# Patient Record
Sex: FEMALE | Race: WHITE | Employment: OTHER | URBAN - METROPOLITAN AREA
[De-identification: names, ages, dates, MRNs, and addresses within clinical notes are randomized per-mention and may not be internally consistent; named-entity substitution may affect disease eponyms.]

---

## 2019-03-06 ENCOUNTER — ANESTHESIA EVENT (OUTPATIENT)
Dept: SURGERY | Age: 79
End: 2019-03-06
Payer: MEDICARE

## 2019-03-06 PROBLEM — H25.12 NUCLEAR SCLEROSIS OF LEFT EYE: Status: ACTIVE | Noted: 2019-03-06

## 2019-03-06 RX ORDER — SODIUM CHLORIDE 0.9 % (FLUSH) 0.9 %
5-40 SYRINGE (ML) INJECTION AS NEEDED
Status: CANCELLED | OUTPATIENT
Start: 2019-03-06

## 2019-03-06 RX ORDER — SODIUM CHLORIDE 0.9 % (FLUSH) 0.9 %
5-40 SYRINGE (ML) INJECTION EVERY 8 HOURS
Status: CANCELLED | OUTPATIENT
Start: 2019-03-06

## 2019-03-06 RX ORDER — ACETAMINOPHEN 325 MG/1
650 TABLET ORAL
Status: CANCELLED | OUTPATIENT
Start: 2019-03-06

## 2019-03-06 RX ORDER — SODIUM CHLORIDE, SODIUM LACTATE, POTASSIUM CHLORIDE, CALCIUM CHLORIDE 600; 310; 30; 20 MG/100ML; MG/100ML; MG/100ML; MG/100ML
125 INJECTION, SOLUTION INTRAVENOUS CONTINUOUS
Status: CANCELLED | OUTPATIENT
Start: 2019-03-06

## 2019-03-07 ENCOUNTER — ANESTHESIA (OUTPATIENT)
Dept: SURGERY | Age: 79
End: 2019-03-07
Payer: MEDICARE

## 2019-03-07 ENCOUNTER — HOSPITAL ENCOUNTER (OUTPATIENT)
Age: 79
Setting detail: OUTPATIENT SURGERY
Discharge: HOME | End: 2019-03-07
Attending: OPHTHALMOLOGY | Admitting: OPHTHALMOLOGY
Payer: MEDICARE

## 2019-03-07 VITALS
DIASTOLIC BLOOD PRESSURE: 50 MMHG | HEART RATE: 56 BPM | OXYGEN SATURATION: 100 % | TEMPERATURE: 97.4 F | SYSTOLIC BLOOD PRESSURE: 133 MMHG | BODY MASS INDEX: 39.24 KG/M2 | HEIGHT: 62 IN | WEIGHT: 213.25 LBS | RESPIRATION RATE: 16 BRPM

## 2019-03-07 PROCEDURE — 76060000032 HC ANESTHESIA 0.5 TO 1 HR: Performed by: OPHTHALMOLOGY

## 2019-03-07 PROCEDURE — 76010000138 HC OR TIME 0.5 TO 1 HR: Performed by: OPHTHALMOLOGY

## 2019-03-07 PROCEDURE — 76210000021 HC REC RM PH II 0.5 TO 1 HR: Performed by: OPHTHALMOLOGY

## 2019-03-07 PROCEDURE — 77030006704 HC BLD OPHTH SLT ALCN -B: Performed by: OPHTHALMOLOGY

## 2019-03-07 PROCEDURE — 74011250636 HC RX REV CODE- 250/636

## 2019-03-07 PROCEDURE — 77030006694 HC BLD OPHTH CRESC ALCN -B: Performed by: OPHTHALMOLOGY

## 2019-03-07 PROCEDURE — 77030032490 HC SLV COMPR SCD KNE COVD -B: Performed by: OPHTHALMOLOGY

## 2019-03-07 PROCEDURE — 77030031761 HC CYSTOTOM OPHTH IRR SYS BVTC -A: Performed by: OPHTHALMOLOGY

## 2019-03-07 PROCEDURE — 77030006692 HC BLD OPHTH BVRGD BD -B: Performed by: OPHTHALMOLOGY

## 2019-03-07 PROCEDURE — 74011000250 HC RX REV CODE- 250: Performed by: OPHTHALMOLOGY

## 2019-03-07 PROCEDURE — 77030011291 HC ERAS HEMSTAT BVTC -A: Performed by: OPHTHALMOLOGY

## 2019-03-07 PROCEDURE — 74011250636 HC RX REV CODE- 250/636: Performed by: OPHTHALMOLOGY

## 2019-03-07 PROCEDURE — V2632 POST CHMBR INTRAOCULAR LENS: HCPCS | Performed by: OPHTHALMOLOGY

## 2019-03-07 PROCEDURE — 77030013389: Performed by: OPHTHALMOLOGY

## 2019-03-07 DEVICE — LENS INTOCU +18.5 DIOPT L13MM DIA6MM A CONSTANT 118.8 10DEG: Type: IMPLANTABLE DEVICE | Site: EYE | Status: FUNCTIONAL

## 2019-03-07 RX ORDER — ONDANSETRON 2 MG/ML
INJECTION INTRAMUSCULAR; INTRAVENOUS AS NEEDED
Status: DISCONTINUED | OUTPATIENT
Start: 2019-03-07 | End: 2019-03-07 | Stop reason: HOSPADM

## 2019-03-07 RX ORDER — CYCLOPENTOLATE HYDROCHLORIDE 10 MG/ML
1 SOLUTION/ DROPS OPHTHALMIC
Status: COMPLETED | OUTPATIENT
Start: 2019-03-07 | End: 2019-03-07

## 2019-03-07 RX ORDER — TROPICAMIDE 10 MG/ML
1 SOLUTION/ DROPS OPHTHALMIC
Status: COMPLETED | OUTPATIENT
Start: 2019-03-07 | End: 2019-03-07

## 2019-03-07 RX ORDER — LIDOCAINE HYDROCHLORIDE 20 MG/ML
INJECTION, SOLUTION INFILTRATION; PERINEURAL AS NEEDED
Status: DISCONTINUED | OUTPATIENT
Start: 2019-03-07 | End: 2019-03-07 | Stop reason: HOSPADM

## 2019-03-07 RX ORDER — MIDAZOLAM HYDROCHLORIDE 1 MG/ML
INJECTION, SOLUTION INTRAMUSCULAR; INTRAVENOUS AS NEEDED
Status: DISCONTINUED | OUTPATIENT
Start: 2019-03-07 | End: 2019-03-07 | Stop reason: HOSPADM

## 2019-03-07 RX ORDER — OFLOXACIN 3 MG/ML
1 SOLUTION/ DROPS OPHTHALMIC
Status: COMPLETED | OUTPATIENT
Start: 2019-03-07 | End: 2019-03-07

## 2019-03-07 RX ORDER — KETOROLAC TROMETHAMINE 5 MG/ML
1 SOLUTION OPHTHALMIC
Status: COMPLETED | OUTPATIENT
Start: 2019-03-07 | End: 2019-03-07

## 2019-03-07 RX ORDER — PROPOFOL 10 MG/ML
INJECTION, EMULSION INTRAVENOUS AS NEEDED
Status: DISCONTINUED | OUTPATIENT
Start: 2019-03-07 | End: 2019-03-07 | Stop reason: HOSPADM

## 2019-03-07 RX ORDER — NEOMYCIN SULFATE, POLYMYXIN B SULFATE, AND DEXAMETHASONE 3.5; 10000; 1 MG/G; [USP'U]/G; MG/G
OINTMENT OPHTHALMIC AS NEEDED
Status: DISCONTINUED | OUTPATIENT
Start: 2019-03-07 | End: 2019-03-07 | Stop reason: HOSPADM

## 2019-03-07 RX ORDER — SODIUM CHLORIDE, SODIUM LACTATE, POTASSIUM CHLORIDE, CALCIUM CHLORIDE 600; 310; 30; 20 MG/100ML; MG/100ML; MG/100ML; MG/100ML
125 INJECTION, SOLUTION INTRAVENOUS CONTINUOUS
Status: DISCONTINUED | OUTPATIENT
Start: 2019-03-07 | End: 2019-03-07 | Stop reason: HOSPADM

## 2019-03-07 RX ORDER — PHENYLEPHRINE HYDROCHLORIDE 100 MG/ML
1 SOLUTION/ DROPS OPHTHALMIC
Status: COMPLETED | OUTPATIENT
Start: 2019-03-07 | End: 2019-03-07

## 2019-03-07 RX ORDER — PROPARACAINE HYDROCHLORIDE 5 MG/ML
1 SOLUTION/ DROPS OPHTHALMIC
Status: COMPLETED | OUTPATIENT
Start: 2019-03-07 | End: 2019-03-07

## 2019-03-07 RX ORDER — BUPIVACAINE HYDROCHLORIDE 7.5 MG/ML
INJECTION, SOLUTION EPIDURAL; RETROBULBAR AS NEEDED
Status: DISCONTINUED | OUTPATIENT
Start: 2019-03-07 | End: 2019-03-07 | Stop reason: HOSPADM

## 2019-03-07 RX ORDER — LIDOCAINE HYDROCHLORIDE 10 MG/ML
INJECTION, SOLUTION EPIDURAL; INFILTRATION; INTRACAUDAL; PERINEURAL AS NEEDED
Status: DISCONTINUED | OUTPATIENT
Start: 2019-03-07 | End: 2019-03-07 | Stop reason: HOSPADM

## 2019-03-07 RX ADMIN — MIDAZOLAM HYDROCHLORIDE 1 MG: 1 INJECTION, SOLUTION INTRAMUSCULAR; INTRAVENOUS at 08:21

## 2019-03-07 RX ADMIN — KETOROLAC TROMETHAMINE 1 DROP: 5 SOLUTION OPHTHALMIC at 07:14

## 2019-03-07 RX ADMIN — OFLOXACIN 1 DROP: 3 SOLUTION OPHTHALMIC at 07:14

## 2019-03-07 RX ADMIN — CYCLOPENTOLATE HYDROCHLORIDE 1 DROP: 10 SOLUTION/ DROPS OPHTHALMIC at 07:21

## 2019-03-07 RX ADMIN — TROPICAMIDE 1 DROP: 10 SOLUTION/ DROPS OPHTHALMIC at 07:14

## 2019-03-07 RX ADMIN — ONDANSETRON 4 MG: 2 INJECTION INTRAMUSCULAR; INTRAVENOUS at 08:43

## 2019-03-07 RX ADMIN — CYCLOPENTOLATE HYDROCHLORIDE 1 DROP: 10 SOLUTION/ DROPS OPHTHALMIC at 07:14

## 2019-03-07 RX ADMIN — SODIUM CHLORIDE, SODIUM LACTATE, POTASSIUM CHLORIDE, AND CALCIUM CHLORIDE 125 ML/HR: 600; 310; 30; 20 INJECTION, SOLUTION INTRAVENOUS at 07:32

## 2019-03-07 RX ADMIN — OFLOXACIN 1 DROP: 3 SOLUTION OPHTHALMIC at 07:22

## 2019-03-07 RX ADMIN — PHENYLEPHRINE HYDROCHLORIDE 1 DROP: 100 SOLUTION/ DROPS OPHTHALMIC at 07:21

## 2019-03-07 RX ADMIN — PROPARACAINE HYDROCHLORIDE 1 DROP: 5 SOLUTION/ DROPS OPHTHALMIC at 07:21

## 2019-03-07 RX ADMIN — TROPICAMIDE 1 DROP: 10 SOLUTION/ DROPS OPHTHALMIC at 07:21

## 2019-03-07 RX ADMIN — MIDAZOLAM HYDROCHLORIDE 1 MG: 1 INJECTION, SOLUTION INTRAMUSCULAR; INTRAVENOUS at 08:19

## 2019-03-07 RX ADMIN — PHENYLEPHRINE HYDROCHLORIDE 1 DROP: 100 SOLUTION/ DROPS OPHTHALMIC at 07:14

## 2019-03-07 RX ADMIN — PROPOFOL 70 MG: 10 INJECTION, EMULSION INTRAVENOUS at 08:21

## 2019-03-07 RX ADMIN — PROPARACAINE HYDROCHLORIDE 1 DROP: 5 SOLUTION/ DROPS OPHTHALMIC at 07:14

## 2019-03-07 RX ADMIN — KETOROLAC TROMETHAMINE 1 DROP: 5 SOLUTION OPHTHALMIC at 07:21

## 2019-03-07 NOTE — H&P
Date of Surgery Update:  Beverley Izaguirre was seen and examined. History and physical has been reviewed. The patient has been examined.  There have been no significant clinical changes since the completion of the originally dated History and Physical.    Signed By: Maria Guadalupe Rinaldi MD     March 7, 2019 7:32 AM

## 2019-03-07 NOTE — OP NOTES
Cataract Surgery Operative Note      Patient:  Shannon Mcdowell       Sex:    female       DOA: 3/7/2019         YOB: 1940     Age:  [de-identified] y.o.        LOS:  LOS: 0 days       Preoperative Diagnosis: CATARACT LEFT EYE    Postoperative Diagnosis:  CATARACT LEFT EYE    Surgeon: Surgeon(s) and Role:     Shandra Montenegro MD - Primary    Anesthesia:  MAC    Procedure:  Procedure(s):  CATARACT EXTRACTION WITH INTRA OCULAR LENS IMPLANT- LEFT EYE    Fluids:  400 cc LR    Procedure in Detail: The patient was brought to the operating room and given a retrobulbar injection to the left eye consisting of 2% lidocaine plain and 0.75% marcaine. This accomplished good anesthesia and akinesia of the globe. A lid speculum was inserted into the eye and the operating microscope was placed in the appropriate position. Catherine scissors and 0.12 pickups were used to create a superior conjunctival peritomy. Hemostasis of the episcleral bed was accomplished with the microcautery eraser tip. A guarded Merrick blade made a groove in the sclera 1-2mm posterior to the blue-gray line. A keratome was used to enter the anterior chamber. Viscoelastic was injected to displace aqueous. A Supersharp blade was used to create a paracentesis tract through clear peripheral cornea at 3:00. The cystitome was inserted into the eye and used to create a rent in the anterior lens capsule. The edge of this was grasped with Utrata forceps and dragged around 360 degrees, creating a circular anterior capsulorhexis. BSS on a cannula was injected under the lip of this to accomplish hydrodelineation and hydrodissection of the lens. The phaco tip was inserted into the eye and used to phacoemulsify and aspirate the entire lens nucleus. The IA tip was then inserted and used to strip residual lens cortex from the capsular bag.   Viscoelastic was used to reform the capsular bag and the foldable intraocular lens on the injector sleeve was inserted into the eye and injected into the capsular bag. It was dialed into appropriate position with the [facility] hook. The IA tip was again inserted and used to remove viscoelastic from the capsular bag around the IOL and from the anterior chamber. Miochol was injected through the paracentesis tract to accomplish pupillary constriction. The wound was tested for patency and no leaks were found. Conjunctivum was reapproximated with cold cautery. The lid speculum was removed, the drapes brought down. Maxitrol ointment was instilled over the eye which was closed with an eye patch followed by a Mitchell shield. The patient was discharged to the recovery room in good condition. Estimated Blood Loss: < 1 cc                 Implants:   Implant Name Type Inv.  Item Serial No.  Lot No. LRB No. Used Action   LENS JAMES ASPHERIC 3PC 18.5D -- TECNIS [id number] - [id number] Intraocular Lens LENS JAMES ASPHERIC 3PC 18.5D -- TECNIS  [id number] [id number] - [employer]  Left 1 Implanted       Specimens: * No specimens in log *            Complications:  None

## 2019-03-07 NOTE — ANESTHESIA POSTPROCEDURE EVALUATION
Post-Anesthesia Evaluation and Assessment    Cardiovascular Function/Vital Signs  Visit Vitals  /62 (BP 1 Location: Left arm)   Pulse (!) 55   Temp 36.3 °C (97.4 °F)   Resp 16   Ht 5' 2\" (1.575 m)   Wt 96.7 kg (213 lb 4 oz)   SpO2 99%   BMI 39.00 kg/m²       Patient is status post Procedure(s):  CATARACT EXTRACTION WITH INTRA OCULAR LENS IMPLANT- LEFT EYE. Nausea/Vomiting: Controlled. Postoperative hydration reviewed and adequate. Pain:  Pain Scale 1: Numeric (0 - 10) (03/07/19 0901)  Pain Intensity 1: 0 (03/07/19 0901)   Managed. Neurological Status:   Neuro (WDL): Within Defined Limits (03/07/19 0901)   At baseline. Mental Status and Level of Consciousness: Baseline and stable. Pulmonary Status:   O2 Device: Room air (03/07/19 0901)   Adequate oxygenation and airway patent. Complications related to anesthesia: None    Post-anesthesia assessment completed. No concerns. Patient has met all discharge requirements.     Signed By: Benny Julian MD

## 2019-03-07 NOTE — DISCHARGE INSTRUCTIONS
DISCHARGE SUMMARY from Nurse    PATIENT INSTRUCTIONS:    After general anesthesia or intravenous sedation, for 24 hours or while taking prescription Narcotics:  · Limit your activities  · Do not drive and operate hazardous machinery  · Do not make important personal or business decisions  · Do  not drink alcoholic beverages  · If you have not urinated within 8 hours after discharge, please contact your surgeon on call. Report the following to your surgeon:  · Excessive pain, swelling, redness or odor of or around the surgical area  · Temperature over 100.5  · Nausea and vomiting lasting longer than 4 hours or if unable to take medications  · Any signs of decreased circulation or nerve impairment to extremity: change in color, persistent  numbness, tingling, coldness or increase pain  · Any questions    What to do at Home:  [address] ON Friday AS SCHEDULED    If you experience any of the following symptoms bleeding, nausea, severe pain, please follow up with dr Inez Harmon    *  Please give a list of your current medications to your Primary Care Provider. *  Please update this list whenever your medications are discontinued, doses are      changed, or new medications (including over-the-counter products) are added. *  Please carry medication information at all times in case of emergency situations. These are general instructions for a healthy lifestyle:    No smoking/ No tobacco products/ Avoid exposure to second hand smoke  Surgeon General's Warning:  Quitting smoking now greatly reduces serious risk to your health.     Obesity, smoking, and sedentary lifestyle greatly increases your risk for illness    A healthy diet, regular physical exercise & weight monitoring are important for maintaining a healthy lifestyle    You may be retaining fluid if you have a history of heart failure or if you experience any of the following symptoms:  Weight gain of 3 pounds or more overnight or 5 pounds in a week, increased swelling in our hands or feet or shortness of breath while lying flat in bed. Please call your doctor as soon as you notice any of these symptoms; do not wait until your next office visit. Recognize signs and symptoms of STROKE:    F-face looks uneven    A-arms unable to move or move unevenly    S-speech slurred or non-existent    T-time-call 911 as soon as signs and symptoms begin-DO NOT go       Back to bed or wait to see if you get better-TIME IS BRAIN. Warning Signs of HEART ATTACK     Call 911 if you have these symptoms:   Chest discomfort. Most heart attacks involve discomfort in the center of the chest that lasts more than a few minutes, or that goes away and comes back. It can feel like uncomfortable pressure, squeezing, fullness, or pain.  Discomfort in other areas of the upper body. Symptoms can include pain or discomfort in one or both arms, the back, neck, jaw, or stomach.  Shortness of breath with or without chest discomfort.  Other signs may include breaking out in a cold sweat, nausea, or lightheadedness. Don't wait more than five minutes to call 911 - MINUTES MATTER! Fast action can save your life. Calling 911 is almost always the fastest way to get lifesaving treatment. Emergency Medical Services staff can begin treatment when they arrive -- up to an hour sooner than if someone gets to the hospital by car. The discharge information has been reviewed with the patient and caregiver. The patient and caregiver verbalized understanding. Discharge medications reviewed with the patient and caregiver and appropriate educational materials and side effects teaching were provided. ___________________________________________________________________________________________________________________________________[facility]      Name: Jenise Thorne MD      : 1940  Post Op Instructions  Phone: [phone]    Diet  1.  Resume usual diet.  2. Do not drink alcohol beverages, including beer, for 24 hours. Activity  1. Do not drive a car or operate any hazardous machinery the day of surgery. 2. You may resume normal activities as tolerated. 3. No bending or heavy lifting  4. No reading. You may watch TV.  5. Bring your eyedrops with you to your appt on Friday 3/8/19 @ 9:40 am in [place]. Wound Care  1. Anticipate that your eye will tear and water. 2. You may also experience a sensation that something is in the eye, like sand or grit but this is normal.  3. Do not touch the affected eye. 4. Do not remove the eye shield until directed to do so by your physician. Medications  1. Take Tylenol Extra Strength 2 tablets by mouth upon arrival home and then every 4 hours as needed for discomfort. 2. Regarding eye drops:  1. In most cases, you will not begin using your eye drops in the surgical eye until the day after surgery. Dr. Roawn Velazquez will go over all the medications with you at your first [place] [id number] visit. 2. If your eye is NOT patched after surgery, you will begin using your drops right away. Specific instructions will be given to you if this is the case. 3. BRING ALL YOUR EYEDROPS TO YOUR APPOINTMENT. Notify your physician IMMEDIATELY for any of the followin. Excessive pain not relieved by Tylenol, especially headache or increasing pressure in the Operative eye. 2. Persistent nausea lasting more than 8 hours. 3. If any vomiting occurs. If any questions or concerns arise, call your surgeon at [phone]    I have received a verbal explanation and a written copy of the above instructions. I am satisfied that all my questions have been answered and I understand these instructions.       _____________________________ _________ ____________________ _________  Signature of Guardian/Parent  Date  Discharging Nurse  Date

## 2019-03-07 NOTE — ANESTHESIA PREPROCEDURE EVALUATION
Anesthetic History   No history of anesthetic complications            Review of Systems / Medical History  Patient summary reviewed, nursing notes reviewed and pertinent labs reviewed    Pulmonary  Within defined limits                 Neuro/Psych   Within defined limits           Cardiovascular    Hypertension              Exercise tolerance: >4 METS     GI/Hepatic/Renal     GERD: well controlled           Endo/Other  Within defined limits           Other Findings              Physical Exam    Airway  Mallampati: III  TM Distance: 4 - 6 cm  Neck ROM: decreased range of motion   Mouth opening: Normal     Cardiovascular  Regular rate and rhythm,  S1 and S2 normal,  no murmur, click, rub, or gallop  Rhythm: regular  Rate: normal         Dental    Dentition: Caps/crowns     Pulmonary  Breath sounds clear to auscultation               Abdominal  GI exam deferred       Other Findings            Anesthetic Plan    ASA: 2  Anesthesia type: MAC            Anesthetic plan and risks discussed with: Patient and Family

## 2019-04-15 PROBLEM — H25.12 NUCLEAR SCLEROSIS OF LEFT EYE: Status: RESOLVED | Noted: 2019-03-06 | Resolved: 2019-04-15

## 2019-04-15 PROBLEM — H25.11 NUCLEAR SCLEROSIS OF RIGHT EYE: Status: ACTIVE | Noted: 2019-04-15

## 2019-04-15 RX ORDER — SODIUM CHLORIDE 0.9 % (FLUSH) 0.9 %
5-40 SYRINGE (ML) INJECTION EVERY 8 HOURS
Status: CANCELLED | OUTPATIENT
Start: 2019-04-15

## 2019-04-15 RX ORDER — SODIUM CHLORIDE 0.9 % (FLUSH) 0.9 %
5-40 SYRINGE (ML) INJECTION AS NEEDED
Status: CANCELLED | OUTPATIENT
Start: 2019-04-15

## 2019-04-15 RX ORDER — ACETAMINOPHEN 325 MG/1
650 TABLET ORAL
Status: CANCELLED | OUTPATIENT
Start: 2019-04-15

## 2019-04-17 ENCOUNTER — ANESTHESIA EVENT (OUTPATIENT)
Dept: SURGERY | Age: 79
End: 2019-04-17
Payer: MEDICARE

## 2019-04-17 RX ORDER — SODIUM CHLORIDE, SODIUM LACTATE, POTASSIUM CHLORIDE, CALCIUM CHLORIDE 600; 310; 30; 20 MG/100ML; MG/100ML; MG/100ML; MG/100ML
125 INJECTION, SOLUTION INTRAVENOUS CONTINUOUS
Status: CANCELLED | OUTPATIENT
Start: 2019-04-17

## 2019-04-17 RX ORDER — FLUMAZENIL 0.1 MG/ML
0.2 INJECTION INTRAVENOUS
Status: CANCELLED | OUTPATIENT
Start: 2019-04-17

## 2019-04-17 RX ORDER — SODIUM CHLORIDE 0.9 % (FLUSH) 0.9 %
5-40 SYRINGE (ML) INJECTION EVERY 8 HOURS
Status: CANCELLED | OUTPATIENT
Start: 2019-04-17

## 2019-04-17 RX ORDER — NALOXONE HYDROCHLORIDE 0.4 MG/ML
0.4 INJECTION, SOLUTION INTRAMUSCULAR; INTRAVENOUS; SUBCUTANEOUS AS NEEDED
Status: CANCELLED | OUTPATIENT
Start: 2019-04-17

## 2019-04-17 RX ORDER — SODIUM CHLORIDE 0.9 % (FLUSH) 0.9 %
5-40 SYRINGE (ML) INJECTION AS NEEDED
Status: CANCELLED | OUTPATIENT
Start: 2019-04-17

## 2019-04-18 ENCOUNTER — ANESTHESIA (OUTPATIENT)
Dept: SURGERY | Age: 79
End: 2019-04-18
Payer: MEDICARE

## 2019-04-18 ENCOUNTER — HOSPITAL ENCOUNTER (OUTPATIENT)
Age: 79
Setting detail: OUTPATIENT SURGERY
Discharge: HOME | End: 2019-04-18
Attending: OPHTHALMOLOGY | Admitting: OPHTHALMOLOGY
Payer: MEDICARE

## 2019-04-18 VITALS
HEART RATE: 57 BPM | BODY MASS INDEX: 38.91 KG/M2 | RESPIRATION RATE: 18 BRPM | TEMPERATURE: 97.2 F | SYSTOLIC BLOOD PRESSURE: 139 MMHG | OXYGEN SATURATION: 100 % | WEIGHT: 211.44 LBS | HEIGHT: 62 IN | DIASTOLIC BLOOD PRESSURE: 60 MMHG

## 2019-04-18 PROCEDURE — 77030006694 HC BLD OPHTH CRESC ALCN -B: Performed by: OPHTHALMOLOGY

## 2019-04-18 PROCEDURE — 77030032490 HC SLV COMPR SCD KNE COVD -B: Performed by: OPHTHALMOLOGY

## 2019-04-18 PROCEDURE — 76010000138 HC OR TIME 0.5 TO 1 HR: Performed by: OPHTHALMOLOGY

## 2019-04-18 PROCEDURE — 74011250636 HC RX REV CODE- 250/636: Performed by: OPHTHALMOLOGY

## 2019-04-18 PROCEDURE — 76210000021 HC REC RM PH II 0.5 TO 1 HR: Performed by: OPHTHALMOLOGY

## 2019-04-18 PROCEDURE — 77030006704 HC BLD OPHTH SLT ALCN -B: Performed by: OPHTHALMOLOGY

## 2019-04-18 PROCEDURE — 77030011291 HC ERAS HEMSTAT BVTC -A: Performed by: OPHTHALMOLOGY

## 2019-04-18 PROCEDURE — 77030013389: Performed by: OPHTHALMOLOGY

## 2019-04-18 PROCEDURE — V2632 POST CHMBR INTRAOCULAR LENS: HCPCS | Performed by: OPHTHALMOLOGY

## 2019-04-18 PROCEDURE — 74011250636 HC RX REV CODE- 250/636

## 2019-04-18 PROCEDURE — 76060000032 HC ANESTHESIA 0.5 TO 1 HR: Performed by: OPHTHALMOLOGY

## 2019-04-18 PROCEDURE — 74011000250 HC RX REV CODE- 250: Performed by: OPHTHALMOLOGY

## 2019-04-18 PROCEDURE — 77030006692 HC BLD OPHTH BVRGD BD -B: Performed by: OPHTHALMOLOGY

## 2019-04-18 PROCEDURE — 77030031761 HC CYSTOTOM OPHTH IRR SYS BVTC -A: Performed by: OPHTHALMOLOGY

## 2019-04-18 DEVICE — LENS INTOCU +20.0 DIOPT L13MM DIA6MM 10DEG 118.7 A CONSTANT: Type: IMPLANTABLE DEVICE | Site: EYE | Status: FUNCTIONAL

## 2019-04-18 RX ORDER — PHENYLEPHRINE HYDROCHLORIDE 100 MG/ML
1 SOLUTION/ DROPS OPHTHALMIC
Status: DISPENSED | OUTPATIENT
Start: 2019-04-18 | End: 2019-04-18

## 2019-04-18 RX ORDER — CYCLOPENTOLATE HYDROCHLORIDE 10 MG/ML
1 SOLUTION/ DROPS OPHTHALMIC
Status: DISPENSED | OUTPATIENT
Start: 2019-04-18 | End: 2019-04-18

## 2019-04-18 RX ORDER — TROPICAMIDE 10 MG/ML
1 SOLUTION/ DROPS OPHTHALMIC
Status: DISPENSED | OUTPATIENT
Start: 2019-04-18 | End: 2019-04-18

## 2019-04-18 RX ORDER — NEOMYCIN SULFATE, POLYMYXIN B SULFATE, AND DEXAMETHASONE 3.5; 10000; 1 MG/G; [USP'U]/G; MG/G
OINTMENT OPHTHALMIC AS NEEDED
Status: DISCONTINUED | OUTPATIENT
Start: 2019-04-18 | End: 2019-04-18 | Stop reason: HOSPADM

## 2019-04-18 RX ORDER — PROPOFOL 10 MG/ML
INJECTION, EMULSION INTRAVENOUS AS NEEDED
Status: DISCONTINUED | OUTPATIENT
Start: 2019-04-18 | End: 2019-04-18 | Stop reason: HOSPADM

## 2019-04-18 RX ORDER — PROPARACAINE HYDROCHLORIDE 5 MG/ML
1 SOLUTION/ DROPS OPHTHALMIC
Status: DISPENSED | OUTPATIENT
Start: 2019-04-18 | End: 2019-04-18

## 2019-04-18 RX ORDER — LIDOCAINE HYDROCHLORIDE 20 MG/ML
INJECTION, SOLUTION INFILTRATION; PERINEURAL AS NEEDED
Status: DISCONTINUED | OUTPATIENT
Start: 2019-04-18 | End: 2019-04-18 | Stop reason: HOSPADM

## 2019-04-18 RX ORDER — KETOROLAC TROMETHAMINE 5 MG/ML
1 SOLUTION OPHTHALMIC
Status: DISPENSED | OUTPATIENT
Start: 2019-04-18 | End: 2019-04-18

## 2019-04-18 RX ORDER — LIDOCAINE HYDROCHLORIDE 10 MG/ML
INJECTION, SOLUTION EPIDURAL; INFILTRATION; INTRACAUDAL; PERINEURAL AS NEEDED
Status: DISCONTINUED | OUTPATIENT
Start: 2019-04-18 | End: 2019-04-18 | Stop reason: HOSPADM

## 2019-04-18 RX ORDER — LIDOCAINE HYDROCHLORIDE 20 MG/ML
INJECTION, SOLUTION EPIDURAL; INFILTRATION; INTRACAUDAL; PERINEURAL AS NEEDED
Status: DISCONTINUED | OUTPATIENT
Start: 2019-04-18 | End: 2019-04-18 | Stop reason: HOSPADM

## 2019-04-18 RX ORDER — BUPIVACAINE HYDROCHLORIDE 7.5 MG/ML
INJECTION, SOLUTION EPIDURAL; RETROBULBAR AS NEEDED
Status: DISCONTINUED | OUTPATIENT
Start: 2019-04-18 | End: 2019-04-18 | Stop reason: HOSPADM

## 2019-04-18 RX ORDER — OFLOXACIN 3 MG/ML
1 SOLUTION/ DROPS OPHTHALMIC
Status: DISPENSED | OUTPATIENT
Start: 2019-04-18 | End: 2019-04-18

## 2019-04-18 RX ORDER — MIDAZOLAM HYDROCHLORIDE 1 MG/ML
INJECTION, SOLUTION INTRAMUSCULAR; INTRAVENOUS AS NEEDED
Status: DISCONTINUED | OUTPATIENT
Start: 2019-04-18 | End: 2019-04-18 | Stop reason: HOSPADM

## 2019-04-18 RX ORDER — ONDANSETRON 2 MG/ML
INJECTION INTRAMUSCULAR; INTRAVENOUS AS NEEDED
Status: DISCONTINUED | OUTPATIENT
Start: 2019-04-18 | End: 2019-04-18 | Stop reason: HOSPADM

## 2019-04-18 RX ORDER — SODIUM CHLORIDE, SODIUM LACTATE, POTASSIUM CHLORIDE, CALCIUM CHLORIDE 600; 310; 30; 20 MG/100ML; MG/100ML; MG/100ML; MG/100ML
75 INJECTION, SOLUTION INTRAVENOUS CONTINUOUS
Status: DISCONTINUED | OUTPATIENT
Start: 2019-04-18 | End: 2019-04-18 | Stop reason: HOSPADM

## 2019-04-18 RX ADMIN — SODIUM CHLORIDE, SODIUM LACTATE, POTASSIUM CHLORIDE, AND CALCIUM CHLORIDE 75 ML/HR: 600; 310; 30; 20 INJECTION, SOLUTION INTRAVENOUS at 07:27

## 2019-04-18 RX ADMIN — PROPARACAINE HYDROCHLORIDE 1 DROP: 5 SOLUTION/ DROPS OPHTHALMIC at 07:04

## 2019-04-18 RX ADMIN — MIDAZOLAM HYDROCHLORIDE 1 MG: 1 INJECTION, SOLUTION INTRAMUSCULAR; INTRAVENOUS at 08:13

## 2019-04-18 RX ADMIN — OFLOXACIN 1 DROP: 3 SOLUTION OPHTHALMIC at 07:09

## 2019-04-18 RX ADMIN — TROPICAMIDE 1 DROP: 10 SOLUTION/ DROPS OPHTHALMIC at 07:09

## 2019-04-18 RX ADMIN — PROPARACAINE HYDROCHLORIDE 1 DROP: 5 SOLUTION/ DROPS OPHTHALMIC at 07:09

## 2019-04-18 RX ADMIN — TROPICAMIDE 1 DROP: 10 SOLUTION/ DROPS OPHTHALMIC at 07:04

## 2019-04-18 RX ADMIN — OFLOXACIN 1 DROP: 3 SOLUTION OPHTHALMIC at 07:04

## 2019-04-18 RX ADMIN — PHENYLEPHRINE HYDROCHLORIDE 1 DROP: 100 SOLUTION/ DROPS OPHTHALMIC at 07:09

## 2019-04-18 RX ADMIN — PROPOFOL 70 MG: 10 INJECTION, EMULSION INTRAVENOUS at 08:17

## 2019-04-18 RX ADMIN — ONDANSETRON 4 MG: 2 INJECTION INTRAMUSCULAR; INTRAVENOUS at 08:21

## 2019-04-18 RX ADMIN — LIDOCAINE HYDROCHLORIDE 20 MG: 20 INJECTION, SOLUTION EPIDURAL; INFILTRATION; INTRACAUDAL; PERINEURAL at 08:17

## 2019-04-18 RX ADMIN — PROPOFOL 10 MG: 10 INJECTION, EMULSION INTRAVENOUS at 08:34

## 2019-04-18 RX ADMIN — MIDAZOLAM HYDROCHLORIDE 1 MG: 1 INJECTION, SOLUTION INTRAMUSCULAR; INTRAVENOUS at 08:08

## 2019-04-18 RX ADMIN — KETOROLAC TROMETHAMINE 1 DROP: 5 SOLUTION OPHTHALMIC at 07:09

## 2019-04-18 RX ADMIN — KETOROLAC TROMETHAMINE 1 DROP: 5 SOLUTION OPHTHALMIC at 07:04

## 2019-04-18 RX ADMIN — PHENYLEPHRINE HYDROCHLORIDE 1 DROP: 100 SOLUTION/ DROPS OPHTHALMIC at 07:04

## 2019-04-18 RX ADMIN — CYCLOPENTOLATE HYDROCHLORIDE 1 DROP: 10 SOLUTION/ DROPS OPHTHALMIC at 07:04

## 2019-04-18 RX ADMIN — CYCLOPENTOLATE HYDROCHLORIDE 1 DROP: 10 SOLUTION/ DROPS OPHTHALMIC at 07:09

## 2019-04-18 NOTE — ANESTHESIA PREPROCEDURE EVALUATION
Relevant Problems No relevant active problems Anesthetic History Pertinent negatives: No PONV Review of Systems / Medical History Patient summary reviewed, nursing notes reviewed and pertinent labs reviewed Pulmonary Pertinent negatives: No asthma, sleep apnea and smoker Neuro/Psych Pertinent negatives: No seizures and CVA Cardiovascular Hypertension: well controlled Valvular problems/murmurs (innocent murmur dx age 27, soft, no limitations) Pertinent negatives: No past MIPacemaker: was told to hold morning dose. Exercise tolerance: >4 METS 
  
GI/Hepatic/Renal 
  
GERD (took meds this AM): well controlled Endo/Other Obesity Other Findings Physical Exam 
 
Airway Mallampati: I 
TM Distance: 4 - 6 cm Neck ROM: normal range of motion Mouth opening: Normal 
 
 Cardiovascular Rhythm: regular Rate: normal 
 
 
 
 Dental 
No notable dental hx Pulmonary Breath sounds clear to auscultation Abdominal 
GI exam deferred Other Findings Anesthetic Plan ASA: 2 Anesthesia type: MAC Anesthetic plan and risks discussed with: Patient Plan monitored anesthetic care. Patient understands risks including risk of awareness and agrees with plan. All questions answered. Consent signed.

## 2019-04-18 NOTE — H&P
Date of Surgery Update: 
Ama Peterson was seen and examined. History and physical has been reviewed. The patient has been examined.  There have been no significant clinical changes since the completion of the originally dated History and Physical. 
 
Signed By: Roula Simmons MD   
 April 18, 2019 8:07 AM

## 2019-04-18 NOTE — DISCHARGE INSTRUCTIONS
[facility]      Name: Rebecca Cunningham MD      : 1940  Post Op Instructions  Phone: [phone]    Diet  1. Resume usual diet. 2. Do not drink alcohol beverages, including beer, for 24 hours. Activity  1. Do not drive a car or operate any hazardous machinery the day of surgery. 2. You may resume normal activities as tolerated. 3. No bending or heavy lifting  4. No reading. You may watch TV.  5. Bring your eyedrops with you to your appt on Friday, 19 @ 10:40 am in [facility]. Wound Care  1. Anticipate that your eye will tear and water. 2. You may also experience a sensation that something is in the eye, like sand or grit but this is normal.  3. Do not touch the affected eye. 4. Do not remove the eye shield until directed to do so by your physician. Medications  1. Take Tylenol Extra Strength 2 tablets by mouth upon arrival home and then every 4 hours as needed for discomfort. 2. Regarding eye drops:  1. In most cases, you will not begin using your eye drops in the surgical eye until the day after surgery. Dr. Lamberto Landis will go over all the medications with you at your first [place] [address] visit. 2. If your eye is NOT patched after surgery, you will begin using your drops right away. Specific instructions will be given to you if this is the case. 3. BRING ALL YOUR EYEDROPS TO YOUR APPOINTMENT. Notify your physician IMMEDIATELY for any of the followin. Excessive pain not relieved by Tylenol, especially headache or increasing pressure in the Operative eye. 2. Persistent nausea lasting more than 8 hours. 3. If any vomiting occurs. If any questions or concerns arise, call your surgeon at [phone]    I have received a verbal explanation and a written copy of the above instructions. I am satisfied that all my questions have been answered and I understand these instructions.       _____________________________ _________ ____________________ _________  Signature of Guardian/Parent  Date  Discharging Nurse  Date              DISCHARGE SUMMARY from Nurse    PATIENT INSTRUCTIONS:    After general anesthesia or intravenous sedation, for 24 hours or while taking prescription Narcotics:  · Limit your activities  · Do not drive and operate hazardous machinery  · Do not make important personal or business decisions  · Do  not drink alcoholic beverages  · If you have not urinated within 8 hours after discharge, please contact your surgeon on call. Report the following to your surgeon:  · Excessive pain, swelling, redness or odor of or around the surgical area  · Temperature over 100.5  · Nausea and vomiting lasting longer than 4 hours or if unable to take medications  · Any signs of decreased circulation or nerve impairment to extremity: change in color, persistent  numbness, tingling, coldness or increase pain  · Any questions    What to do at Home:  Recommended activity: Ambulate in house,     If you experience any of the following symptoms above, please follow up with Dr. Ankur Landin. *  Please give a list of your current medications to your Primary Care Provider. *  Please update this list whenever your medications are discontinued, doses are      changed, or new medications (including over-the-counter products) are added. *  Please carry medication information at all times in case of emergency situations. These are general instructions for a healthy lifestyle:    No smoking/ No tobacco products/ Avoid exposure to second hand smoke  Surgeon General's Warning:  Quitting smoking now greatly reduces serious risk to your health.     Obesity, smoking, and sedentary lifestyle greatly increases your risk for illness    A healthy diet, regular physical exercise & weight monitoring are important for maintaining a healthy lifestyle    You may be retaining fluid if you have a history of heart failure or if you experience any of the following symptoms:  Weight gain of 3 pounds or more overnight or 5 pounds in a week, increased swelling in our hands or feet or shortness of breath while lying flat in bed. Please call your doctor as soon as you notice any of these symptoms; do not wait until your next office visit. Recognize signs and symptoms of STROKE:    F-face looks uneven    A-arms unable to move or move unevenly    S-speech slurred or non-existent    T-time-call 911 as soon as signs and symptoms begin-DO NOT go       Back to bed or wait to see if you get better-TIME IS BRAIN. Warning Signs of HEART ATTACK     Call 911 if you have these symptoms:   Chest discomfort. Most heart attacks involve discomfort in the center of the chest that lasts more than a few minutes, or that goes away and comes back. It can feel like uncomfortable pressure, squeezing, fullness, or pain.  Discomfort in other areas of the upper body. Symptoms can include pain or discomfort in one or both arms, the back, neck, jaw, or stomach.  Shortness of breath with or without chest discomfort.  Other signs may include breaking out in a cold sweat, nausea, or lightheadedness. Don't wait more than five minutes to call 911 - MINUTES MATTER! Fast action can save your life. Calling 911 is almost always the fastest way to get lifesaving treatment. Emergency Medical Services staff can begin treatment when they arrive -- up to an hour sooner than if someone gets to the hospital by car. Patient armband removed and shredded    The discharge information has been reviewed with the patient and caregiver. The patient and caregiver verbalized understanding. Discharge medications reviewed with the patient and caregiver and appropriate educational materials and side effects teaching were provided.   ___________________________________________________________________________________________________________________________________

## 2019-04-18 NOTE — OP NOTES
Cataract Surgery Operative Note      Patient:  Jose Enrique Stoll       Sex:    female       DOA: 4/18/2019         YOB: 1940     Age:  [de-identified] y.o.        LOS:  LOS: 0 days       Preoperative Diagnosis: CATARACT RIGHT EYE    Postoperative Diagnosis:  CATARACT RIGHT EYE    Surgeon: Surgeon(s) and Role:     Naomi Hernandez MD - Primary    Anesthesia:  MAC    Procedure:  Procedure(s):  CATARACT EXTRACTION WITH INTRA OCULAR LENS IMPLANT- RIGHT EYE    Fluids:  500 cc LR    Procedure in Detail: The patient was brought to the operating room and given a retrobulbar injection to the right eye consisting of 2% lidocaine plain and 0.75% marcaine. This accomplished good anesthesia and akinesia of the globe. A lid speculum was inserted into the eye and the operating microscope was placed in the appropriate position. Catherine scissors and 0.12 pickups were used to create a superior conjunctival peritomy. Hemostasis of the episcleral bed was accomplished with the microcautery eraser tip. A guarded Hankins blade made a groove in the sclera 1-2mm posterior to the blue-gray line. A keratome was used to enter the anterior chamber. Viscoelastic was injected to displace aqueous. A Supersharp blade was used to create a paracentesis tract through clear peripheral cornea at 3:00. The cystitome was inserted into the eye and used to create a rent in the anterior lens capsule. The edge of this was grasped with Utrata forceps and dragged around 360 degrees, creating a circular anterior capsulorhexis. BSS on a cannula was injected under the lip of this to accomplish hydrodelineation and hydrodissection of the lens. The phaco tip was inserted into the eye and used to phacoemulsify and aspirate the entire lens nucleus. The IA tip was then inserted and used to strip residual lens cortex from the capsular bag.   Viscoelastic was used to reform the capsular bag and the foldable intraocular lens on the injector sleeve was inserted into the eye and injected into the capsular bag. It was dialed into appropriate position with the [facility] hook. The IA tip was again inserted and used to remove viscoelastic from the capsular bag around the IOL and from the anterior chamber. Miochol was injected through the paracentesis tract to accomplish pupillary constriction. The wound was tested for patency and no leaks were found. Conjunctivum was reapproximated with cold cautery. The lid speculum was removed, the drapes brought down. Maxitrol ointment was instilled over the eye which was closed with an eye patch followed by a Mitchell shield. The patient was discharged to the recovery room in good condition. Estimated Blood Loss: < 1 cc                 Implants:   Implant Name Type Inv.  Item Serial No.  Lot No. LRB No. Used Action   LENS JAMES ASPHERIC 3PC 20.0D -- TECNIS  - [id number] Intraocular Lens LENS JAMES ASPHERIC 3PC 20.0D -- TECNIS  [id number] [facility] - [employer]  Right 1 Implanted       Specimens: * No specimens in log *            Complications:  None

## 2019-04-18 NOTE — PERIOP NOTES
Reviewed PTA medication list with patient/caregiver and patient/caregiver denies any additional medications. Patient admits to having a responsible adult care for them at home for at least 24 hours after surgery. Patient denies altagracia chewing/swallowing difficulties. Dual skin assessment & fall risk band verification completed with Lisa Blum RN.

## 2019-04-18 NOTE — ANESTHESIA POSTPROCEDURE EVALUATION
Post-Anesthesia Evaluation and Assessment Cardiovascular Function/Vital Signs Visit Vitals /48 Pulse (!) 57 Temp 36.2 °C (97.1 °F) Resp 16 Ht 5' 2\" (1.575 m) Wt 95.9 kg (211 lb 7 oz) SpO2 100% BMI 38.67 kg/m² Patient is status post Procedure(s): CATARACT EXTRACTION WITH INTRA OCULAR LENS IMPLANT- RIGHT EYE. Nausea/Vomiting: Controlled. Postoperative hydration reviewed and adequate. Pain: 
Pain Scale 1: Numeric (0 - 10) (04/18/19 0855) Pain Intensity 1: 0 (04/18/19 0855) Managed. Neurological Status:  
Neuro (WDL): Within Defined Limits (04/18/19 0855) At baseline. Mental Status and Level of Consciousness: Arousable. Pulmonary Status:  
O2 Device: Room air (04/18/19 0855) Adequate oxygenation and airway patent. Complications related to anesthesia: None Post-anesthesia assessment completed. No concerns. Patient has met all discharge requirements. Signed By: Thea Sapp MD  
 April 18, 2019

## 2019-07-15 ENCOUNTER — HOSPITAL ENCOUNTER (OUTPATIENT)
Dept: PREADMISSION TESTING | Age: 79
Discharge: HOME | End: 2019-07-15
Payer: MEDICARE

## 2019-07-15 DIAGNOSIS — M16.11 PRIMARY OSTEOARTHRITIS OF RIGHT HIP: ICD-10-CM

## 2019-07-15 LAB
ABO + RH BLD: NORMAL
ALBUMIN SERPL-MCNC: 3.5 G/DL (ref 3.4–5)
ALBUMIN/GLOB SERPL: 1.1 {RATIO} (ref 0.8–1.7)
ALP SERPL-CCNC: 100 U/L (ref 45–117)
ALT SERPL-CCNC: 20 U/L (ref 13–56)
ANION GAP SERPL CALC-SCNC: 5 MMOL/L (ref 3–18)
APPEARANCE UR: CLEAR
APTT PPP: 26.4 SEC (ref 23–36.4)
AST SERPL-CCNC: 12 U/L (ref 15–37)
ATRIAL RATE: 63 BPM
BACTERIA SPEC CULT: NORMAL
BACTERIA URNS QL MICRO: ABNORMAL /HPF
BILIRUB SERPL-MCNC: 0.6 MG/DL (ref 0.2–1)
BILIRUB UR QL: NEGATIVE
BLOOD GROUP ANTIBODIES SERPL: NORMAL
BUN SERPL-MCNC: 40 MG/DL (ref 7–18)
BUN/CREAT SERPL: 38 (ref 12–20)
CALCIUM SERPL-MCNC: 8.9 MG/DL (ref 8.5–10.1)
CALCULATED P AXIS, ECG09: -3 DEGREES
CALCULATED R AXIS, ECG10: -37 DEGREES
CALCULATED T AXIS, ECG11: 50 DEGREES
CHLORIDE SERPL-SCNC: 107 MMOL/L (ref 100–108)
CO2 SERPL-SCNC: 25 MMOL/L (ref 21–32)
COLOR UR: YELLOW
CREAT SERPL-MCNC: 1.05 MG/DL (ref 0.6–1.3)
DIAGNOSIS, 93000: NORMAL
EPITH CASTS URNS QL MICRO: ABNORMAL /LPF (ref 0–5)
ERYTHROCYTE [DISTWIDTH] IN BLOOD BY AUTOMATED COUNT: 14.9 % (ref 11.6–14.5)
ERYTHROCYTE [SEDIMENTATION RATE] IN BLOOD: 31 MM/HR (ref 0–30)
GLOBULIN SER CALC-MCNC: 3.3 G/DL (ref 2–4)
GLUCOSE SERPL-MCNC: 135 MG/DL (ref 74–99)
GLUCOSE UR STRIP.AUTO-MCNC: NEGATIVE MG/DL
HCT VFR BLD AUTO: 38.1 % (ref 35–45)
HGB BLD-MCNC: 12.1 G/DL (ref 12–16)
HGB UR QL STRIP: NEGATIVE
INR PPP: 1 (ref 0.8–1.2)
KETONES UR QL STRIP.AUTO: NEGATIVE MG/DL
LEUKOCYTE ESTERASE UR QL STRIP.AUTO: ABNORMAL
MCH RBC QN AUTO: 26 PG (ref 24–34)
MCHC RBC AUTO-ENTMCNC: 31.8 G/DL (ref 31–37)
MCV RBC AUTO: 81.8 FL (ref 74–97)
MUCOUS THREADS URNS QL MICRO: ABNORMAL /LPF
NITRITE UR QL STRIP.AUTO: NEGATIVE
P-R INTERVAL, ECG05: 206 MS
PH UR STRIP: 5 [PH] (ref 5–8)
PLATELET # BLD AUTO: 170 K/UL (ref 135–420)
PMV BLD AUTO: 9.6 FL (ref 9.2–11.8)
POTASSIUM SERPL-SCNC: 5.2 MMOL/L (ref 3.5–5.5)
PROT SERPL-MCNC: 6.8 G/DL (ref 6.4–8.2)
PROT UR STRIP-MCNC: 30 MG/DL
PROTHROMBIN TIME: 13 SEC (ref 11.5–15.2)
Q-T INTERVAL, ECG07: 410 MS
QRS DURATION, ECG06: 142 MS
QTC CALCULATION (BEZET), ECG08: 419 MS
RBC # BLD AUTO: 4.66 M/UL (ref 4.2–5.3)
RBC #/AREA URNS HPF: ABNORMAL /HPF (ref 0–5)
SERVICE CMNT-IMP: NORMAL
SODIUM SERPL-SCNC: 137 MMOL/L (ref 136–145)
SP GR UR REFRACTOMETRY: 1.02 (ref 1–1.03)
SPECIMEN EXP DATE BLD: NORMAL
UROBILINOGEN UR QL STRIP.AUTO: 0.2 EU/DL (ref 0.2–1)
VENTRICULAR RATE, ECG03: 63 BPM
WBC # BLD AUTO: 7.4 K/UL (ref 4.6–13.2)
WBC URNS QL MICRO: ABNORMAL /HPF (ref 0–5)

## 2019-07-15 PROCEDURE — 85027 COMPLETE CBC AUTOMATED: CPT

## 2019-07-15 PROCEDURE — 85652 RBC SED RATE AUTOMATED: CPT

## 2019-07-15 PROCEDURE — 93005 ELECTROCARDIOGRAM TRACING: CPT

## 2019-07-15 PROCEDURE — 81001 URINALYSIS AUTO W/SCOPE: CPT

## 2019-07-15 PROCEDURE — 85610 PROTHROMBIN TIME: CPT

## 2019-07-15 PROCEDURE — 85730 THROMBOPLASTIN TIME PARTIAL: CPT

## 2019-07-15 PROCEDURE — 87641 MR-STAPH DNA AMP PROBE: CPT

## 2019-07-15 PROCEDURE — 36415 COLL VENOUS BLD VENIPUNCTURE: CPT

## 2019-07-15 PROCEDURE — 86900 BLOOD TYPING SEROLOGIC ABO: CPT

## 2019-07-15 PROCEDURE — 80053 COMPREHEN METABOLIC PANEL: CPT

## 2019-07-17 PROBLEM — E66.9 OBESITY: Status: ACTIVE | Noted: 2019-07-17

## 2019-07-17 PROBLEM — E78.5 HYPERLIPIDEMIA: Status: ACTIVE | Noted: 2019-07-17

## 2019-07-17 PROBLEM — I10 HYPERTENSION: Status: ACTIVE | Noted: 2019-07-17

## 2019-07-17 PROBLEM — M16.11 OSTEOARTHRITIS OF RIGHT HIP: Status: ACTIVE | Noted: 2019-07-17

## 2019-07-17 NOTE — H&P
Patient Name:  Lamar Joe ([id number])  YOB: 1940      Chief Complaint:  Followup right hip injection. History of Chief Complaint:  Ms. Son Peralta presents today two weeks status post right hip intraarticular injection. She reports significant improvement for about a week. She states the pain down her anterior thigh went away. The pain in her groin resolved after about two days but the relief only lasted for about a week. She states she was more aware of the pain in her lower back when the hip pain resolved but she states the pain is slowly returning and is worse at night, worse with range of motion and physical activity.     Past Medical/Surgical History:    Disease/Disorder Date Side Surgery Date Side Comment               Cataract extraction  bilateral       Hammertoe surgery  right    Acid reflux         High cholesterol         Hypertension         Uterine adenocarcinoma   Hysterectomy 1998     Leiomyosarcoma   Radiation therapy 2009  [facility] [place] 06/06/2019 -vaginal, with chemo, had to stop chemo due to left hip deterioration     Allergies:    Ingredient Reaction Medication Name Comment   SULFA (SULFONAMIDE ANTIBIOTICS)      TAPE, PERMEABLE ADHESIVE   silk tape       Current Medications:    Medication Directions   omeprazole 20 mg capsule,delayed release take 1 capsule by oral route  every day before a meal   simvastatin 10 mg tablet take 1 tablet by oral route  every day in the evening   meloxicam 15 mg tablet take 1 tablet by oral route  every day   lisinopril 40 mg tablet take 1 tablet by oral route  every day   lidocaine 5 % topical patch apply 1 patch by transdermal route  every day (May wear up to 12hours.)   Caltrate 600 + D    atenolol 50 mg tablet take 1 tablet by oral route  every day   aspirin 81 mg tablet,delayed release take 1 tablet by oral route every day     Social History:    SMOKING  Status Tobacco Type Units Per Day Yrs Used   Never smoker        ALCOHOL  There is a history of alcohol use. consumed occasionally. Family History:    Disease Detail Family Member Age Cause of Death Comments   Cancer, prostate Father  Y    Myocardial infarction Maternal grandfather  Y      Review of Systems:    Pertinent negatives include fever, gout and muscle weakness. Vitals:  Date BP Pulse Temp (F) Resp. (per min.) Height (Total in.) Weight (lbs.) BMI   06/11/2019     63.00  36.67     Physical Examination:    Psychiatric/General:  No acute distress; pleasant and accommodating. HEENT:  Moist mucous membranes intact. Lymphatic:  Neck is supple with no lymphadenopathy upon evaluation. Respiratory:   Equal bilateral chest wall movement with inspiration; no wheezes or stridor audible. Cardiovascular:    Regular rate and rhythm, as noted by upper extremity pulses. Musculoskeletal: On evaluation of the right hip, range of motion with forward flexion, internal and external rotation reproduces pain in the anterior aspect of the thigh andinto the groin. Gross motor and sensation is intact. Skin is without ulceration or lesion. Assessment: Right hip symptoms as noted above with underlying moderate to severe arthritic changes of the right hip. Recommendation: At this time the patient has failed conservative measures including medications and injections. We discussed moving forward with a right total hip arthroplasty. We discussed her ongoing underlying lumbar pathology that may need to be addressed in the future. The patient is understanding of this. The risks and benefits of the procedure were reviewed and include infection, bleeding, deep venous thrombosis, pulmonary embolism, heart attack, stroke, death, arthrofibrosis, need for manipulation, neurovascular compromise, need for revision surgical intervention, persistent long-term pain, need for chronic pain management, and metallic allergies. The patient will followup postoperatively and call with any and all concerns. BJORN Bearden, DO

## 2019-07-17 NOTE — DISCHARGE INSTRUCTIONS
[facility]  Dr. Dory Herrmann Post-Operative Instructions Total Hip Replacement    ACTIVITIES :  1. You may be up and walking about the house with your walker. 2.  Activities around the house, such as washing dishes, fixing light meals, and your own personal care are fine. 3.  Avoid strenuous activities, such as vacuuming, lifting laundry or grocery bags. 4.  Walking is the best way to rebuild strength and stamina. Start SLOWLY and gradually increase your distance. 5.  Avoid any jogging, running or excessive stair-climbing   6. Your home physical therapist will work with you for the first 7-14 days. 7.  Follow-up with Dr. Manasa Weller in 10-14 days. BATHING and INCISION CARE:  1. The incision may be tender to touch or feel numb: this is normal.   2.  Keep the incision clean and dry no showering until your follow-up appointment. The incision will be closed with sutures under the skin and the skin will be glued. 3.  Do not apply any lotions, ointments or oils on the incision. 4.  If you notice any excessive swelling, redness, or persistent drainage around the incision, notify the office immediately. DRIVIN. You should not drive until after your follow-up appointment. 2.  You can be in a vehicle for short distances, but if you travel any long distance, please stop about every 30 minutes and walk/stretch. 3.  You should NEVER drive while taking narcotic medication. RETURN TO WORK :  1. The decision to return to work will be determined on an individual basis. 2.  Many people who have a strenuous job (construction, heavy labor, etc) may need to be off work for up to 12 weeks. 3.  If you need a work note, please let us know as soon as possible, and not the same day you are planning to return to work. NUTRITION :  1.  Good nutrition is an essential part of healing. 2.  You should eat a balanced diet each day, including fruits, vegetables, dairy products and protein. 3.  Remember to drink plenty of water. 4.  If you have not had a bowel movement within 3 days of surgery, you will need to use a laxative or suppository that can be obtained over-the-counter at your local pharmacy. MEDICATIONS -  1. You may resume the medications you were taking before surgery. 2.  You will receive a prescription for pain medication at discharge from the hospital. The pain medication works best if taken before the pain becomes severe. 3.  To reduce stomach upset, always take the medication with food. 4.  Begin to wean yourself off the pain medication during the second week after discharge. 5.  If you need a refill, please call the office during working hours at least 2 days before your prescription runs out. Do not wait until your bottle is empty to call for a refill. 6.  You will also be prescribed a blood thinner that you will take by injection for 21 days post-operatively. 7.  DO NOT drive if you are taking narcotic pain medications. HOME HEALTH CARE:  1.   A home health care service has been set-up for you to help assist you once you leave the hospital.  2.  They will contact you either before you leave the hospital or within 24 hours once you have been discharged home. 3. A nurse will assist you with your dressing changes and a Physical Therapist with help you with your therapy needs. CALL THE OFFICE:   If you have severe pain unrelieved by the medications;   If you have a fever of 101.0°F or greater;    If you notice excessive swelling, redness, or persistent drainage from the incision or IV site; [facility] office number is [phone] from 8:00am to 5:00pm Monday through Friday. After 5:00pm, on weekends, or holidays, please leave a message with our answering service and the doctor on-call will get back to you shortly.

## 2019-07-22 ENCOUNTER — ANESTHESIA EVENT (OUTPATIENT)
Dept: SURGERY | Age: 79
DRG: 470 | End: 2019-07-22
Payer: MEDICARE

## 2019-07-23 ENCOUNTER — APPOINTMENT (OUTPATIENT)
Dept: GENERAL RADIOLOGY | Age: 79
DRG: 470 | End: 2019-07-23
Attending: ORTHOPAEDIC SURGERY
Payer: MEDICARE

## 2019-07-23 ENCOUNTER — HOSPITAL ENCOUNTER (INPATIENT)
Age: 79
LOS: 1 days | Discharge: HOME HEALTH | DRG: 470 | End: 2019-07-24
Attending: ORTHOPAEDIC SURGERY | Admitting: ORTHOPAEDIC SURGERY
Payer: MEDICARE

## 2019-07-23 ENCOUNTER — ANESTHESIA (OUTPATIENT)
Dept: SURGERY | Age: 79
DRG: 470 | End: 2019-07-23
Payer: MEDICARE

## 2019-07-23 DIAGNOSIS — Z96.641 S/P HIP REPLACEMENT, RIGHT: Primary | ICD-10-CM

## 2019-07-23 PROBLEM — M16.9 OA (OSTEOARTHRITIS) OF HIP: Status: ACTIVE | Noted: 2019-07-23

## 2019-07-23 PROCEDURE — 77030018846 HC SOL IRR STRL H20 ICUM -A: Performed by: ORTHOPAEDIC SURGERY

## 2019-07-23 PROCEDURE — 74011250637 HC RX REV CODE- 250/637: Performed by: ANESTHESIOLOGY

## 2019-07-23 PROCEDURE — 74011250636 HC RX REV CODE- 250/636: Performed by: ORTHOPAEDIC SURGERY

## 2019-07-23 PROCEDURE — 0SR903A REPLACEMENT OF RIGHT HIP JOINT WITH CERAMIC SYNTHETIC SUBSTITUTE, UNCEMENTED, OPEN APPROACH: ICD-10-PCS | Performed by: ORTHOPAEDIC SURGERY

## 2019-07-23 PROCEDURE — 73501 X-RAY EXAM HIP UNI 1 VIEW: CPT

## 2019-07-23 PROCEDURE — 77030040361 HC SLV COMPR DVT MDII -B: Performed by: ORTHOPAEDIC SURGERY

## 2019-07-23 PROCEDURE — 77030020407 HC IV BLD WRMR ST 3M -A: Performed by: ANESTHESIOLOGY

## 2019-07-23 PROCEDURE — 74011000250 HC RX REV CODE- 250: Performed by: ORTHOPAEDIC SURGERY

## 2019-07-23 PROCEDURE — 74011000258 HC RX REV CODE- 258

## 2019-07-23 PROCEDURE — 77030031139 HC SUT VCRL2 J&J -A: Performed by: ORTHOPAEDIC SURGERY

## 2019-07-23 PROCEDURE — 77030012890

## 2019-07-23 PROCEDURE — 77030020782 HC GWN BAIR PAWS FLX 3M -B: Performed by: ORTHOPAEDIC SURGERY

## 2019-07-23 PROCEDURE — 74011000258 HC RX REV CODE- 258: Performed by: ORTHOPAEDIC SURGERY

## 2019-07-23 PROCEDURE — 77030006643: Performed by: ANESTHESIOLOGY

## 2019-07-23 PROCEDURE — 97116 GAIT TRAINING THERAPY: CPT

## 2019-07-23 PROCEDURE — C9290 INJ, BUPIVACAINE LIPOSOME: HCPCS | Performed by: ORTHOPAEDIC SURGERY

## 2019-07-23 PROCEDURE — 74011250636 HC RX REV CODE- 250/636

## 2019-07-23 PROCEDURE — 74011250636 HC RX REV CODE- 250/636: Performed by: ANESTHESIOLOGY

## 2019-07-23 PROCEDURE — 74011250636 HC RX REV CODE- 250/636: Performed by: PHYSICIAN ASSISTANT

## 2019-07-23 PROCEDURE — 74011000250 HC RX REV CODE- 250

## 2019-07-23 PROCEDURE — 77030003666 HC NDL SPINAL BD -A: Performed by: ORTHOPAEDIC SURGERY

## 2019-07-23 PROCEDURE — 77030039267 HC ADH SKN EXOFIN S2SG -B: Performed by: ORTHOPAEDIC SURGERY

## 2019-07-23 PROCEDURE — 74011250637 HC RX REV CODE- 250/637: Performed by: PHYSICIAN ASSISTANT

## 2019-07-23 PROCEDURE — 77030027138 HC INCENT SPIROMETER -A

## 2019-07-23 PROCEDURE — 77030037875 HC DRSG MEPILEX <16IN BORD MOLN -A: Performed by: ORTHOPAEDIC SURGERY

## 2019-07-23 PROCEDURE — 77030027138 HC INCENT SPIROMETER -A: Performed by: ORTHOPAEDIC SURGERY

## 2019-07-23 PROCEDURE — 97161 PT EVAL LOW COMPLEX 20 MIN: CPT

## 2019-07-23 PROCEDURE — 77030031140 HC SUT VCRL3 J&J -A: Performed by: ORTHOPAEDIC SURGERY

## 2019-07-23 PROCEDURE — 77030018836 HC SOL IRR NACL ICUM -A: Performed by: ORTHOPAEDIC SURGERY

## 2019-07-23 PROCEDURE — C1776 JOINT DEVICE (IMPLANTABLE): HCPCS | Performed by: ORTHOPAEDIC SURGERY

## 2019-07-23 PROCEDURE — 77030013708 HC HNDPC SUC IRR PULS STRY –B: Performed by: ORTHOPAEDIC SURGERY

## 2019-07-23 PROCEDURE — 77030029099 HC BN WAX SSPC -A: Performed by: ORTHOPAEDIC SURGERY

## 2019-07-23 PROCEDURE — 65270000029 HC RM PRIVATE

## 2019-07-23 PROCEDURE — 77030038010: Performed by: ORTHOPAEDIC SURGERY

## 2019-07-23 PROCEDURE — 77030008477 HC STYL SATN SLP COVD -A: Performed by: ANESTHESIOLOGY

## 2019-07-23 PROCEDURE — 76060000034 HC ANESTHESIA 1.5 TO 2 HR: Performed by: ORTHOPAEDIC SURGERY

## 2019-07-23 PROCEDURE — 77030034694 HC SCPL CANADY PLSM DISP USMD -E: Performed by: ORTHOPAEDIC SURGERY

## 2019-07-23 PROCEDURE — 77030008683 HC TU ET CUF COVD -A: Performed by: ANESTHESIOLOGY

## 2019-07-23 PROCEDURE — 77010033678 HC OXYGEN DAILY

## 2019-07-23 PROCEDURE — 76210000001 HC OR PH I REC 2.5 TO 3 HR: Performed by: ORTHOPAEDIC SURGERY

## 2019-07-23 PROCEDURE — 76010000153 HC OR TIME 1.5 TO 2 HR: Performed by: ORTHOPAEDIC SURGERY

## 2019-07-23 DEVICE — CUP ACET DIA50MM HIP GRIPTION PRI CEMENTLESS FIX SECT SER: Type: IMPLANTABLE DEVICE | Site: HIP | Status: FUNCTIONAL

## 2019-07-23 DEVICE — LINER ACET OD50MM ID32MM NEUT OFFSET HIP ALTRX PINN: Type: IMPLANTABLE DEVICE | Site: HIP | Status: FUNCTIONAL

## 2019-07-23 DEVICE — COMPONENT TOT HIP PRIMARY CERM ALTRX: Type: IMPLANTABLE DEVICE | Site: HIP | Status: FUNCTIONAL

## 2019-07-23 DEVICE — HEAD FEM DIA32MM +1MM OFFSET 12/14 TAPR HIP CERAMIC BIOLOX: Type: IMPLANTABLE DEVICE | Site: HIP | Status: FUNCTIONAL

## 2019-07-23 DEVICE — ELIMINATOR H APEX FOR 48-60MM PINN HIP SHELL: Type: IMPLANTABLE DEVICE | Site: HIP | Status: FUNCTIONAL

## 2019-07-23 RX ORDER — HYDROMORPHONE HYDROCHLORIDE 2 MG/ML
0.5 INJECTION, SOLUTION INTRAMUSCULAR; INTRAVENOUS; SUBCUTANEOUS
Status: DISCONTINUED | OUTPATIENT
Start: 2019-07-23 | End: 2019-07-24 | Stop reason: HOSPADM

## 2019-07-23 RX ORDER — ACETAMINOPHEN 500 MG
1000 TABLET ORAL
Status: COMPLETED | OUTPATIENT
Start: 2019-07-23 | End: 2019-07-23

## 2019-07-23 RX ORDER — EPHEDRINE SULFATE/0.9% NACL/PF 25 MG/5 ML
SYRINGE (ML) INTRAVENOUS AS NEEDED
Status: DISCONTINUED | OUTPATIENT
Start: 2019-07-23 | End: 2019-07-23 | Stop reason: HOSPADM

## 2019-07-23 RX ORDER — ACETAMINOPHEN 325 MG/1
650 TABLET ORAL
Status: DISCONTINUED | OUTPATIENT
Start: 2019-07-23 | End: 2019-07-24 | Stop reason: HOSPADM

## 2019-07-23 RX ORDER — GLYCOPYRROLATE 0.2 MG/ML
INJECTION INTRAMUSCULAR; INTRAVENOUS AS NEEDED
Status: DISCONTINUED | OUTPATIENT
Start: 2019-07-23 | End: 2019-07-23 | Stop reason: HOSPADM

## 2019-07-23 RX ORDER — PHENYLEPHRINE HCL IN 0.9% NACL 1 MG/10 ML
SYRINGE (ML) INTRAVENOUS AS NEEDED
Status: DISCONTINUED | OUTPATIENT
Start: 2019-07-23 | End: 2019-07-23 | Stop reason: HOSPADM

## 2019-07-23 RX ORDER — PREGABALIN 50 MG/1
50 CAPSULE ORAL
Status: COMPLETED | OUTPATIENT
Start: 2019-07-23 | End: 2019-07-23

## 2019-07-23 RX ORDER — SODIUM CHLORIDE, SODIUM LACTATE, POTASSIUM CHLORIDE, CALCIUM CHLORIDE 600; 310; 30; 20 MG/100ML; MG/100ML; MG/100ML; MG/100ML
125 INJECTION, SOLUTION INTRAVENOUS CONTINUOUS
Status: DISCONTINUED | OUTPATIENT
Start: 2019-07-23 | End: 2019-07-24 | Stop reason: HOSPADM

## 2019-07-23 RX ORDER — LANOLIN ALCOHOL/MO/W.PET/CERES
1 CREAM (GRAM) TOPICAL 2 TIMES DAILY WITH MEALS
Status: DISCONTINUED | OUTPATIENT
Start: 2019-07-23 | End: 2019-07-24 | Stop reason: HOSPADM

## 2019-07-23 RX ORDER — MIDAZOLAM HYDROCHLORIDE 1 MG/ML
INJECTION, SOLUTION INTRAMUSCULAR; INTRAVENOUS AS NEEDED
Status: DISCONTINUED | OUTPATIENT
Start: 2019-07-23 | End: 2019-07-23 | Stop reason: HOSPADM

## 2019-07-23 RX ORDER — SODIUM CHLORIDE 0.9 % (FLUSH) 0.9 %
5-40 SYRINGE (ML) INJECTION AS NEEDED
Status: DISCONTINUED | OUTPATIENT
Start: 2019-07-23 | End: 2019-07-24 | Stop reason: HOSPADM

## 2019-07-23 RX ORDER — HYDROMORPHONE HYDROCHLORIDE 2 MG/ML
INJECTION, SOLUTION INTRAMUSCULAR; INTRAVENOUS; SUBCUTANEOUS AS NEEDED
Status: DISCONTINUED | OUTPATIENT
Start: 2019-07-23 | End: 2019-07-23 | Stop reason: HOSPADM

## 2019-07-23 RX ORDER — ROCURONIUM BROMIDE 10 MG/ML
INJECTION, SOLUTION INTRAVENOUS AS NEEDED
Status: DISCONTINUED | OUTPATIENT
Start: 2019-07-23 | End: 2019-07-23 | Stop reason: HOSPADM

## 2019-07-23 RX ORDER — DOCUSATE SODIUM 100 MG/1
100 CAPSULE, LIQUID FILLED ORAL
Status: DISCONTINUED | OUTPATIENT
Start: 2019-07-23 | End: 2019-07-24 | Stop reason: HOSPADM

## 2019-07-23 RX ORDER — DIPHENHYDRAMINE HYDROCHLORIDE 50 MG/ML
12.5 INJECTION, SOLUTION INTRAMUSCULAR; INTRAVENOUS
Status: DISCONTINUED | OUTPATIENT
Start: 2019-07-23 | End: 2019-07-24 | Stop reason: HOSPADM

## 2019-07-23 RX ORDER — CEFAZOLIN SODIUM/WATER 2 G/20 ML
2 SYRINGE (ML) INTRAVENOUS EVERY 8 HOURS
Status: COMPLETED | OUTPATIENT
Start: 2019-07-23 | End: 2019-07-24

## 2019-07-23 RX ORDER — DEXAMETHASONE SODIUM PHOSPHATE 4 MG/ML
INJECTION, SOLUTION INTRA-ARTICULAR; INTRALESIONAL; INTRAMUSCULAR; INTRAVENOUS; SOFT TISSUE AS NEEDED
Status: DISCONTINUED | OUTPATIENT
Start: 2019-07-23 | End: 2019-07-23 | Stop reason: HOSPADM

## 2019-07-23 RX ORDER — NEOSTIGMINE METHYLSULFATE 5 MG/5 ML
SYRINGE (ML) INTRAVENOUS AS NEEDED
Status: DISCONTINUED | OUTPATIENT
Start: 2019-07-23 | End: 2019-07-23 | Stop reason: HOSPADM

## 2019-07-23 RX ORDER — ONDANSETRON 2 MG/ML
INJECTION INTRAMUSCULAR; INTRAVENOUS AS NEEDED
Status: DISCONTINUED | OUTPATIENT
Start: 2019-07-23 | End: 2019-07-23 | Stop reason: HOSPADM

## 2019-07-23 RX ORDER — SIMVASTATIN 10 MG/1
10 TABLET, FILM COATED ORAL
Status: DISCONTINUED | OUTPATIENT
Start: 2019-07-23 | End: 2019-07-24 | Stop reason: HOSPADM

## 2019-07-23 RX ORDER — CEFAZOLIN SODIUM/WATER 2 G/20 ML
2 SYRINGE (ML) INTRAVENOUS ONCE
Status: COMPLETED | OUTPATIENT
Start: 2019-07-23 | End: 2019-07-23

## 2019-07-23 RX ORDER — NALOXONE HYDROCHLORIDE 0.4 MG/ML
0.4 INJECTION, SOLUTION INTRAMUSCULAR; INTRAVENOUS; SUBCUTANEOUS AS NEEDED
Status: DISCONTINUED | OUTPATIENT
Start: 2019-07-23 | End: 2019-07-24 | Stop reason: HOSPADM

## 2019-07-23 RX ORDER — LIDOCAINE HYDROCHLORIDE 20 MG/ML
INJECTION, SOLUTION EPIDURAL; INFILTRATION; INTRACAUDAL; PERINEURAL AS NEEDED
Status: DISCONTINUED | OUTPATIENT
Start: 2019-07-23 | End: 2019-07-23 | Stop reason: HOSPADM

## 2019-07-23 RX ORDER — DEXMEDETOMIDINE HYDROCHLORIDE 4 UG/ML
INJECTION, SOLUTION INTRAVENOUS AS NEEDED
Status: DISCONTINUED | OUTPATIENT
Start: 2019-07-23 | End: 2019-07-23 | Stop reason: HOSPADM

## 2019-07-23 RX ORDER — LISINOPRIL 20 MG/1
40 TABLET ORAL DAILY
Status: DISCONTINUED | OUTPATIENT
Start: 2019-07-24 | End: 2019-07-24 | Stop reason: HOSPADM

## 2019-07-23 RX ORDER — ENOXAPARIN SODIUM 100 MG/ML
40 INJECTION SUBCUTANEOUS DAILY
Status: DISCONTINUED | OUTPATIENT
Start: 2019-07-24 | End: 2019-07-24 | Stop reason: HOSPADM

## 2019-07-23 RX ORDER — PROPOFOL 10 MG/ML
INJECTION, EMULSION INTRAVENOUS AS NEEDED
Status: DISCONTINUED | OUTPATIENT
Start: 2019-07-23 | End: 2019-07-23 | Stop reason: HOSPADM

## 2019-07-23 RX ORDER — OMEPRAZOLE 20 MG/1
20 CAPSULE, DELAYED RELEASE ORAL DAILY
Status: DISCONTINUED | OUTPATIENT
Start: 2019-07-24 | End: 2019-07-24 | Stop reason: HOSPADM

## 2019-07-23 RX ORDER — ONDANSETRON 2 MG/ML
4 INJECTION INTRAMUSCULAR; INTRAVENOUS
Status: DISCONTINUED | OUTPATIENT
Start: 2019-07-23 | End: 2019-07-24 | Stop reason: HOSPADM

## 2019-07-23 RX ORDER — KETAMINE HYDROCHLORIDE 10 MG/ML
INJECTION, SOLUTION INTRAMUSCULAR; INTRAVENOUS AS NEEDED
Status: DISCONTINUED | OUTPATIENT
Start: 2019-07-23 | End: 2019-07-23 | Stop reason: HOSPADM

## 2019-07-23 RX ORDER — HYDROMORPHONE HYDROCHLORIDE 1 MG/ML
1 INJECTION, SOLUTION INTRAMUSCULAR; INTRAVENOUS; SUBCUTANEOUS
Status: DISCONTINUED | OUTPATIENT
Start: 2019-07-23 | End: 2019-07-24 | Stop reason: HOSPADM

## 2019-07-23 RX ORDER — FENTANYL CITRATE 50 UG/ML
INJECTION, SOLUTION INTRAMUSCULAR; INTRAVENOUS AS NEEDED
Status: DISCONTINUED | OUTPATIENT
Start: 2019-07-23 | End: 2019-07-23 | Stop reason: HOSPADM

## 2019-07-23 RX ORDER — SODIUM CHLORIDE 0.9 % (FLUSH) 0.9 %
5-40 SYRINGE (ML) INJECTION EVERY 8 HOURS
Status: DISCONTINUED | OUTPATIENT
Start: 2019-07-23 | End: 2019-07-24 | Stop reason: HOSPADM

## 2019-07-23 RX ORDER — ATENOLOL 50 MG/1
50 TABLET ORAL DAILY
Status: DISCONTINUED | OUTPATIENT
Start: 2019-07-24 | End: 2019-07-24 | Stop reason: HOSPADM

## 2019-07-23 RX ORDER — OXYCODONE HYDROCHLORIDE 5 MG/1
5-10 TABLET ORAL
Status: DISCONTINUED | OUTPATIENT
Start: 2019-07-23 | End: 2019-07-24 | Stop reason: HOSPADM

## 2019-07-23 RX ADMIN — KETAMINE HYDROCHLORIDE 10 MG: 10 INJECTION, SOLUTION INTRAMUSCULAR; INTRAVENOUS at 10:43

## 2019-07-23 RX ADMIN — FENTANYL CITRATE 100 MCG: 50 INJECTION, SOLUTION INTRAMUSCULAR; INTRAVENOUS at 09:51

## 2019-07-23 RX ADMIN — SODIUM CHLORIDE, SODIUM LACTATE, POTASSIUM CHLORIDE, AND CALCIUM CHLORIDE 500 ML: 600; 310; 30; 20 INJECTION, SOLUTION INTRAVENOUS at 12:40

## 2019-07-23 RX ADMIN — SODIUM CHLORIDE, SODIUM LACTATE, POTASSIUM CHLORIDE, AND CALCIUM CHLORIDE 125 ML/HR: 600; 310; 30; 20 INJECTION, SOLUTION INTRAVENOUS at 13:47

## 2019-07-23 RX ADMIN — DEXAMETHASONE SODIUM PHOSPHATE 4 MG: 4 INJECTION, SOLUTION INTRA-ARTICULAR; INTRALESIONAL; INTRAMUSCULAR; INTRAVENOUS; SOFT TISSUE at 09:50

## 2019-07-23 RX ADMIN — Medication 10 MG: at 10:18

## 2019-07-23 RX ADMIN — Medication 10 MG: at 10:10

## 2019-07-23 RX ADMIN — HYDROMORPHONE HYDROCHLORIDE 0.2 MG: 2 INJECTION, SOLUTION INTRAMUSCULAR; INTRAVENOUS; SUBCUTANEOUS at 11:16

## 2019-07-23 RX ADMIN — Medication 2.6 MG: at 11:14

## 2019-07-23 RX ADMIN — SIMVASTATIN 10 MG: 10 TABLET, FILM COATED ORAL at 22:11

## 2019-07-23 RX ADMIN — ROCURONIUM BROMIDE 40 MG: 10 INJECTION, SOLUTION INTRAVENOUS at 09:53

## 2019-07-23 RX ADMIN — MIDAZOLAM HYDROCHLORIDE 0.5 MG: 1 INJECTION, SOLUTION INTRAMUSCULAR; INTRAVENOUS at 09:46

## 2019-07-23 RX ADMIN — Medication 10 MG: at 10:02

## 2019-07-23 RX ADMIN — KETAMINE HYDROCHLORIDE 20 MG: 10 INJECTION, SOLUTION INTRAMUSCULAR; INTRAVENOUS at 10:30

## 2019-07-23 RX ADMIN — PROPOFOL 150 MG: 10 INJECTION, EMULSION INTRAVENOUS at 09:53

## 2019-07-23 RX ADMIN — Medication 2 G: at 09:46

## 2019-07-23 RX ADMIN — SODIUM CHLORIDE, SODIUM LACTATE, POTASSIUM CHLORIDE, AND CALCIUM CHLORIDE: 600; 310; 30; 20 INJECTION, SOLUTION INTRAVENOUS at 10:14

## 2019-07-23 RX ADMIN — ONDANSETRON 4 MG: 2 INJECTION INTRAMUSCULAR; INTRAVENOUS at 11:10

## 2019-07-23 RX ADMIN — FERROUS SULFATE TAB 325 MG (65 MG ELEMENTAL FE) 325 MG: 325 (65 FE) TAB at 16:37

## 2019-07-23 RX ADMIN — Medication 2 G: at 16:37

## 2019-07-23 RX ADMIN — TRANEXAMIC ACID 1 G: 100 INJECTION, SOLUTION INTRAVENOUS at 11:12

## 2019-07-23 RX ADMIN — HYDROMORPHONE HYDROCHLORIDE 0.2 MG: 2 INJECTION, SOLUTION INTRAMUSCULAR; INTRAVENOUS; SUBCUTANEOUS at 11:25

## 2019-07-23 RX ADMIN — Medication 100 MCG: at 10:01

## 2019-07-23 RX ADMIN — DEXMEDETOMIDINE HYDROCHLORIDE 8 MCG: 4 INJECTION, SOLUTION INTRAVENOUS at 10:36

## 2019-07-23 RX ADMIN — KETAMINE HYDROCHLORIDE 10 MG: 10 INJECTION, SOLUTION INTRAMUSCULAR; INTRAVENOUS at 10:33

## 2019-07-23 RX ADMIN — DEXMEDETOMIDINE HYDROCHLORIDE 4 MCG: 4 INJECTION, SOLUTION INTRAVENOUS at 10:39

## 2019-07-23 RX ADMIN — PREGABALIN 50 MG: 50 CAPSULE ORAL at 08:01

## 2019-07-23 RX ADMIN — TRANEXAMIC ACID 1 G: 100 INJECTION, SOLUTION INTRAVENOUS at 10:01

## 2019-07-23 RX ADMIN — LIDOCAINE HYDROCHLORIDE 80 MG: 20 INJECTION, SOLUTION EPIDURAL; INFILTRATION; INTRACAUDAL; PERINEURAL at 09:53

## 2019-07-23 RX ADMIN — ACETAMINOPHEN 1000 MG: 500 TABLET ORAL at 08:01

## 2019-07-23 RX ADMIN — SODIUM CHLORIDE, SODIUM LACTATE, POTASSIUM CHLORIDE, AND CALCIUM CHLORIDE 125 ML/HR: 600; 310; 30; 20 INJECTION, SOLUTION INTRAVENOUS at 08:01

## 2019-07-23 RX ADMIN — GLYCOPYRROLATE 0.4 MG: 0.2 INJECTION INTRAMUSCULAR; INTRAVENOUS at 11:14

## 2019-07-23 RX ADMIN — Medication 10 MG: at 09:54

## 2019-07-23 RX ADMIN — SODIUM CHLORIDE, SODIUM LACTATE, POTASSIUM CHLORIDE, AND CALCIUM CHLORIDE 125 ML/HR: 600; 310; 30; 20 INJECTION, SOLUTION INTRAVENOUS at 12:00

## 2019-07-23 RX ADMIN — KETAMINE HYDROCHLORIDE 10 MG: 10 INJECTION, SOLUTION INTRAMUSCULAR; INTRAVENOUS at 10:39

## 2019-07-23 NOTE — PROGRESS NOTES
200 - Assumed care at this time. Pain rated 0/10. Pt resting quietly in bed. No signs of distress. Will continue to monitor. Pt encouraged to call for assistance. 65 - Patient ambulated to BR with steady gait at this time. Patient A&Ox4, RA. Denies chest pain and SOB. 18G IV to right hand  intact and patent. SCD compression device and TEDs bilaterally. Mepilex dressing to right hip CDI. Denies numbness/tingling/calf pain. Pain 1/10 with a tolerable level of 5/10. Pt educated on IS use, q2h rounds, pain management, CHG wipes and \"Up for Meals\". Pt verbalized understanding, no concerns voiced. Call bell within reach, bed in lowest position. Pt encouraged to call for assistance. 2206 - Patient ambulated OOB to BR with steady gait. No concerns voiced. Call bell within reach, bed in lowest position. Pt encouraged to call for assistance. 2958 - Patient ambulated OOB to BR with steady gait. Pain rated 5/10, pain medication administered per MAR. No concerns voiced. Call bell within reach, bed in lowest position. Pt encouraged to call for assistance. 7670 - Patient ambulated OOB to BR with steady gait. No concerns voiced. Pt up in chair. Call bell  And telephone within reach. Pt encouraged to call for assistance.

## 2019-07-23 NOTE — PERIOP NOTES
TRANSFER - IN REPORT:    Verbal report received from ORN & CRNA on Kelsey Sage  being received from OR (unit) for routine post - op      Report consisted of patients Situation, Background, Assessment and   Recommendations(SBAR). Information from the following report(s) SBAR was reviewed with the receiving nurse. Opportunity for questions and clarification was provided. Assessment completed upon patients arrival to unit and care assumed.

## 2019-07-23 NOTE — PERIOP NOTES
Report to KARTHIK Burgess. Also patients son allowed to visit in the pacu briefly he left a bag with her belongings to be brought over with patient to her room. The only thing he took with him was her cane, he was leaving to go home.

## 2019-07-23 NOTE — ANESTHESIA POSTPROCEDURE EVALUATION
Post-Anesthesia Evaluation and Assessment    Cardiovascular Function/Vital Signs  Visit Vitals  /67   Pulse (!) 56   Temp 36.3 °C (97.4 °F)   Resp 11   Ht 5' 4\" (1.626 m)   Wt 87.6 kg (193 lb 2 oz)   SpO2 98%   BMI 33.15 kg/m²       Patient is status post Procedure(s):  RIGHT TOTAL HIP ARTHROPLASTY ANTERIOR APRPOACH WITH C-ARM, \"SPEC POP\". Nausea/Vomiting: Controlled. Postoperative hydration reviewed and adequate. Pain:  Pain Scale 1: Numeric (0 - 10) (07/23/19 1255)  Pain Intensity 1: 0 (07/23/19 1255)   Managed. Neurological Status:   Neuro (WDL): Exceptions to WDL(unsteady gait) (07/23/19 0751)   At baseline. Mental Status and Level of Consciousness: Baseline and stable. Pulmonary Status:   O2 Device: Nasal cannula (07/23/19 1255)   Adequate oxygenation and airway patent. Complications related to anesthesia: None    Post-anesthesia assessment completed. No concerns. Patient has met all discharge requirements.     Signed By: Aime Chadwick MD

## 2019-07-23 NOTE — OP NOTES
OPERATIVE NOTE    Patient: Symone Patiño MRN: [id number]  SSN: [id number]    YOB: 1940  Age: [de-identified] y.o. Sex: female      Indications: This is a [de-identified]y.o. year-old female who presents with hip pain. She was positive for hip OA. The patient was admitted for surgery as conservative measures have failed. Date of Procedure: 7/23/2019     Preoperative Diagnosis: OSTEOARTHRITIS RIGHT HIP    Postoperative Diagnosis: OSTEOARTHRITIS RIGHT HIP      Procedure: Procedure(s):  RIGHT TOTAL HIP ARTHROPLASTY ANTERIOR APRPOACH WITH C-ARM, \"SPEC POP\"    Anesthesia: general    Surgeon: Erik Bojorquez, and Surgical Assistant: Kar Adames PA-C    Assistant: Circ-1: Giovanni Harkins RN  Circ-2: Heladio Marion  Physician Assistant: Sabina Aguero PA-C  Radiology Technician: Sobeida Sanchez  Scrub Tech-1: Dori Gurrola  Scrub Tech-2: Juan Corcoran  Surg Asst-1: Leonela Ahumada    Estimated Blood Loss: 200ml  IVF 1500 ml LR    Specimens: * No specimens in log *     Drains: none    Implants:   Implant Name Type Inv. Item Serial No.  Lot No. LRB No. Used Action   CUP ACET SECTOR GRIPTION 50MM -- TI - [id number]  CUP ACET SECTOR GRIPTION 50MM -- TI  [facility] [employer] [id number] Right 1 Implanted   LINER ACET PINN NEUT 32X50 -- ALTRX - [id number]  LINER ACET PINN NEUT 32X50 -- ALTRX  [facility] [employer] [id number] Right 1 Implanted   PLUG ACET APCL H ELIM POS STP --  - [id number]  PLUG ACET APCL H ELIM POS STP --   [facility] [id number] Right 1 Implanted   FEMORAL STEM    DEPUY ORTHO [id number] Right 1 Implanted   HEAD FEM CER 32MM +1MM NK -- DELTA - [id number]  HEAD FEM CER 32MM +1MM NK -- DELTA  [facility] [employer] [id number] Right 1 Implanted       Complications: None; patient tolerated the procedure well. Procedure: The patient was greeted by anesthesia and taken to the operative suite where he underwent general endotracheal anesthesia.   He was positioned on a radiolucent [place] hip table with both feet secured and positioned in holding boots. The right hip was sterilely prepped and draped in standard fashion. A 3.5 inch incision was made just below the ASIS in line with the medial border of the tensor fascia andrew. Incision was made and the Tensor was mobilized laterally and the Rectus was mobilized medially. The circumflex vessels were isolated and cauterized to prevent post-operative bleeding. Pre-capsular fat was removed and an anterior H shaped capsulotomy was performed. Retractors were positioned and a femoral neck osteotomy was made with an oscillating saw. The head was dislocated from the acetabulum and the soft tissue labrum was removed with sharp scalpel dissection. Progressive reaming was performed with 45 degrees of abduction and 20 degrees of anteversion. Fluoroscopy was utilized to help confirm appropriate cup placement. A DePuy Sector  50 mm cup was impacted and found to be extremely stable. A single dome hole plug was placed. A 32 liner was placed and implacted and found to be stable. Attention was turned to the femoral shaft. The foot was dropped to the floor, externally rotated 120 degrees and adducted. This exposed the femoral canal nicely with the use of a femoral hook. Progressive broaching was performed until excellent longitudinal and rotational stability was obtained. Trial components were placed and fluoroscopy was utilized to gain excellent leg length equality, hip offset and stability with traction as well as internal and external rotation. Trial components were removed and the tissues were irrigated with 1000 cc of sterile saline with Bacitracin. The cautery unit was utilized to treat the surrounding soft tissues and prevent post operative bleeding and hematoma formation. Subsequently, a size 6 Depuy Actis femoral component with a standard neck angle was impacted into position.   A 32+1 head was placed, impacted and reduced into the acetabular shell.  Fluoroscopy confirmed excellent placement, leg length equality, hip offset and stability. The surrounding tissues were injected with 30 cc's of .25 percent Marcaine with epi and 30 mg of Exparell Dilute to 100 ml with saline for post operative pain control. There was minimal bleeding and no drain was placed. The Tensor was reapproximated with running Vicryl. The skin edges were reapproximated with 2-0 Vicry and the skin withDermabond Prineo skin sealant as well as a sterile dressing. The patient recovered from anesthesia and was transferred to the post anesthesia care unit in stable condition.      Kareem Cross DO  7/23/2019  11:23 AM

## 2019-07-23 NOTE — BRIEF OP NOTE
BRIEF OPERATIVE NOTE    Date of Procedure: 7/23/2019   Preoperative Diagnosis: OSTEOARTHRITIS RIGHT HIP  Postoperative Diagnosis: OSTEOARTHRITIS RIGHT HIP    Procedure(s):  RIGHT TOTAL HIP ARTHROPLASTY ANTERIOR APRPOACH WITH C-ARM, \"SPEC POP\"  Surgeon(s) and Role:     * Carmen Bauer DO - Chadd         Surgical Assistant: gurjit    Surgical Staff:  Circ-1: Vikas Phillips, RN  Circ-2: Ck Monte  Physician Assistant: Gavino Brewer PA-C  Radiology Technician: Sanjeev Edmondson Tech-1: Angela Price  Scrub Tech-2: Tyson Cevallos  Surg Asst-1: Lamberto Dodson  Event Time In Time Out   Incision Start 1036    Incision Close       Anesthesia: General   Estimated Blood Loss: 200 ml  IVF 1500 ml Lr  Specimens: * No specimens in log *   Findings: severe oa   Complications: none  Implants:   Implant Name Type Inv.  Item Serial No.  Lot No. LRB No. Used Action   CUP ACET SECTOR GRIPTION 50MM -- TI - [id number]  CUP ACET SECTOR GRIPTION 50MM -- TI  [facility] [employer] [id number] Right 1 Implanted   LINER ACET PINN NEUT 32X50 -- ALTRX - [id number]  LINER ACET PINN NEUT 32X50 -- ALTRX  [facility] [employer] [id number] Right 1 Implanted   PLUG ACET APCL H ELIM POS STP --  - [id number]  PLUG ACET APCL H ELIM POS STP --   [facility] [employer] [id number] Right 1 Implanted   FEMORAL STEM    DEPUY ORTHO [id number] Right 1 Implanted   HEAD FEM CER 32MM +1MM NK -- DELTA - [id number]  HEAD FEM CER 32MM +1MM NK -- DELTA  [facility] [employer] [id number] Right 1 Implanted

## 2019-07-23 NOTE — ROUTINE PROCESS
1915  Bedside in verbal shift change report given to [address] (oncoming nurse) by Briseida Willis RN (off going nurse). Report included the following information: SBAR, KARDEX, MAR and recent results.

## 2019-07-23 NOTE — PROGRESS NOTES
Problem: Mobility Impaired (Adult and Pediatric)  Goal: *Acute Goals and Plan of Care (Insert Text)  Description  In 1-7 days pt will be able to perform:  ST.  Bed mobility:  Rolling L to R to L modified independent for positioning. 2.  Supine to sit to supine S with HR for meals. 3.  Sit to stand to sit S with RW in prep for ambulation. LT.  Gait:  Ambulate >150ft S with RW, WBAT, for home/community mobility. 2.  Stair Negotiation:  Ascend/descend >3 steps CGA with HR for home entry. 3.  Activity tolerance: Tolerate up in chair 1-2 hours for ADLs. 4.  Patient/Family Education:  Patient/family to be independent with HEP for follow-up care and safe discharge. Note:   PHYSICAL THERAPY EVALUATION    Patient: Beatrice Officer (74 y.o. female)  Date: 2019  Primary Diagnosis: OA (osteoarthritis) of hip [M16.9]  Procedure(s) (LRB):  RIGHT TOTAL HIP ARTHROPLASTY ANTERIOR APRPOACH WITH C-ARM, \"SPEC POP\" (Right) Day of Surgery   Precautions:   Fall, WBAT    ASSESSMENT :  Based on the objective data described below, the patient presents with decreased functional mobility and independence in regard to bed mobility, transfers, gt quality and tolerance, R hip AROM, R hip strength, pain, balance, activity tolerance, stair negotiation and safety due to recent R HUAN surgery. Pt rating pain on numerical pain scale 2/10. Pt required CGA for supine<>sit<>stand. Pt required vc for safe techniques. Pt able to participate in gt training w/ RW, WBAT, GB and CGA in hallway w/ antalgic pattern. Pt able to void on commode and perform own hygiene. Pt returned to supine in bed w/ all needs within reach, SCDs applied and ice pack to R hip. Nurse Mj Monterroso aware. [facility] upon hospital d/c. Patient will benefit from skilled intervention to address the above impairments. Patients rehabilitation potential is considered to be Good  Factors which may influence rehabilitation potential include:   ? None noted  ? Mental ability/status  ? Medical condition  ? Home/family situation and support systems  ? Safety awareness  ? Pain tolerance/management  ? Other:      PLAN :  Recommendations and Planned Interventions:  ?           Bed Mobility Training             ? Neuromuscular Re-Education  ? Transfer Training                   ? Orthotic/Prosthetic Training  ? Gait Training                          ? Modalities  ? Therapeutic Exercises          ? Edema Management/Control  ? Therapeutic Activities            ? Patient and Family Training/Education  ? Other (comment):    Frequency/Duration: Patient will be followed by physical therapy twice daily to address goals. Discharge Recommendations: Home Health  Further Equipment Recommendations for Discharge: N/A     SUBJECTIVE:   Patient stated I am feeling very well.     OBJECTIVE DATA SUMMARY:     Past Medical History:   Diagnosis Date    Arthritis     hip, back    Cancer ([facility] [address] 1998; 2010    uterus and abdomen- chemo and radiatation    GERD (gastroesophageal reflux disease)     Heart murmur     Hypertension 2004    Ill-defined condition 2010    chemotherapy      Past Surgical History:   Procedure Laterality Date    HX GYN      removal of vaginal cancer     HX HEENT      bilateral cateract removal    HX HYSTERECTOMY  1997    HX ORTHOPAEDIC      hammer toe right foot    HX ORTHOPAEDIC      left hip replacement      Barriers to Learning/Limitations: None  Compensate with: visual, verbal, tactile, kinesthetic cues/model  Prior Level of Function/Home Situation:   Home Situation  Home Environment: Private residence  # Steps to Enter: 4  Rails to Enter: Yes  Hand Rails : Bilateral  One/Two Story Residence: Two story, live on 1st floor  # of Interior Steps: 13  Interior Rails: Left  Lift Chair Available: No  Living Alone: Yes  Support Systems: Family member(s)  Patient Expects to be Discharged to[de-identified] Private residence  Current DME Used/Available at Home: [address], [place], straight, Walker, rolling  Critical Behavior:  Neurologic State: Alert; Appropriate for age  Orientation Level: Oriented X4  Cognition: Appropriate decision making; Appropriate for age attention/concentration; Appropriate safety awareness; Follows commands  Safety/Judgement: Awareness of environment  Psychosocial  Patient Behaviors: Calm; Cooperative  Purposeful Interaction: Yes  Pt Identified Daily Priority: Clinical issues (comment)  Caritas Process: Nurture loving kindness  Caring Interventions: Reassure  Reassure: Therapeutic listening  Skin Condition/Temp: Dry;Warm  Skin Integrity: Incision (comment)(R hip)  Skin Integumentary  Skin Color: Appropriate for ethnicity  Skin Condition/Temp: Dry;Warm  Skin Integrity: Incision (comment)(R hip)  Turgor: Non-tenting  Hair Growth: Present  Varicosities: Absent  Strength:    Strength: Generally decreased, functional  Tone & Sensation:   Tone: Normal  Sensation: Intact  Range Of Motion:  AROM: Generally decreased, functional  Functional Mobility:  Bed Mobility:  Supine to Sit: Contact guard assistance(vc)  Sit to Supine: Contact guard assistance(vc)  Scooting: Contact guard assistance(vc)  Transfers:  Sit to Stand: Contact guard assistance(vc)  Stand to Sit: Contact guard assistance(vc)  Balance:   Sitting: Intact  Standing: Intact; With support  Ambulation/Gait Training:  Distance (ft): 145 Feet (ft)  Assistive Device: Walker, rolling;Gait belt  Ambulation - Level of Assistance: Contact guard assistance(vc)  Gait Abnormalities: Antalgic;Decreased step clearance; Step to gait  Right Side Weight Bearing: As tolerated  Base of Support: Shift to left  Stance: Right decreased  Speed/Kristie: Slow  Step Length: Left shortened;Right shortened  Swing Pattern: Left asymmetrical;Right asymmetrical  Interventions: Safety awareness training; Tactile cues; Verbal cues;Visual/Demos  Therapeutic Exercises:   HEP written copy issued to pt per MD protocol. Pain:  Pain Scale 1: Numeric (0 - 10)  Pain Intensity 1: 2  Pain Location 1: Hip  Pain Orientation 1: Right  Pain Description 1: Dull  Activity Tolerance:   Fair   Please refer to the flowsheet for vital signs taken during this treatment. After treatment:   ?         Patient left in no apparent distress sitting up in chair  ? Patient left in no apparent distress in bed  ? Call bell left within reach  ? Nursing notified  ? Caregiver present  ? Bed alarm activated    COMMUNICATION/EDUCATION:   ?         Fall prevention education was provided and the patient/caregiver indicated understanding. ? Patient/family have participated as able in goal setting and plan of care. ?         Patient/family agree to work toward stated goals and plan of care. ?         Patient understands intent and goals of therapy, but is neutral about his/her participation. ? Patient is unable to participate in goal setting and plan of care.     Thank you for this referral.  Asya Martínez, PT   Time Calculation: 24 mins       Eval Complexity: History: HIGH Complexity :3+ comorbidities / personal factors will impact the outcome/ POC Exam:MEDIUM Complexity : 3 Standardized tests and measures addressing body structure, function, activity limitation and / or participation in recreation  Presentation: LOW Complexity : Stable, uncomplicated  Clinical Decision Making:Low Complexity    Overall Complexity:LOW

## 2019-07-23 NOTE — ROUTINE PROCESS
1300  TRANSFER - IN REPORT:    Verbal report received from [facility] RN(name) on Cindy Swift being received from [employer]) for routine post - op      Report consisted of patients Situation, Background, Assessment and   Recommendations(SBAR). Information from the following report(s) SBAR, Kardex, Intake/Output, MAR and Recent Results was reviewed with the receiving nurse. Opportunity for questions and clarification was provided. Assessment completed upon patients arrival to unit and care assumed.

## 2019-07-23 NOTE — PERIOP NOTES
Discussed with patient that silk tape will be used during procedure. Patient states that silk tape on for a short duration, to include one hour, will be fine. States the allergy is from extensive time frames.

## 2019-07-23 NOTE — PROGRESS NOTES
PT orders received and chart reviewed. Pt just received meal and is starting to eat. Will return later once pt schedule allows.

## 2019-07-23 NOTE — INTERVAL H&P NOTE
H&P Update:  Elvin Mchugh was seen and examined. History and physical has been reviewed. The patient has been examined. There have been no significant clinical changes since the completion of the originally dated History and Physical.  Patient identified by surgeon; surgical site was confirmed by patient and surgeon.   Plan for right total hip

## 2019-07-23 NOTE — PROGRESS NOTES
1330  Assumed care of patient at this time, assessment complete. Patient alert and oriented x4. Denies SOB and chest pain. Patient lungs clear bilaterally. Cap refilled  less than 3 seconds. Patient denies numbness and tingling to all extremities. Stated pain 3/10. Patient has 18 G IV to right hand. Dressing to right hip, mepilex . TEDs and SCDs applied to BLE. Patient encouraged to use IS. Patient verbalized understanding. Ice pack in place, call light and personal items in reach, bed in low position and locked, will continue to monitor patient. Fall risk arm band in place. 1335  Ambulated patient with walker to bathroom, then returned to patient to bed.     1757  Patient ambulating hallway with physical therapist.     1900  Patient had uneventful shift, patient has denied pain medication at this time. No signs or symptoms of distress. Patient ambulating and voiding sufficient amounts. Plan for patient to d/c to home tomorrow.

## 2019-07-23 NOTE — PERIOP NOTES
I paged Dr. Gentry Ortiz and he came to see the patient briefed him on BP the diastolic was out of range. We changed BP cuff to right arm and I was told to give a fluid bolus of 500ml see MAR.

## 2019-07-24 VITALS
BODY MASS INDEX: 32.97 KG/M2 | HEIGHT: 64 IN | SYSTOLIC BLOOD PRESSURE: 101 MMHG | OXYGEN SATURATION: 100 % | RESPIRATION RATE: 16 BRPM | DIASTOLIC BLOOD PRESSURE: 42 MMHG | TEMPERATURE: 99.1 F | WEIGHT: 193.13 LBS | HEART RATE: 80 BPM

## 2019-07-24 LAB
ERYTHROCYTE [DISTWIDTH] IN BLOOD BY AUTOMATED COUNT: 14.6 % (ref 11.6–14.5)
HCT VFR BLD AUTO: 30.2 % (ref 35–45)
HGB BLD-MCNC: 10.2 G/DL (ref 12–16)
MCH RBC QN AUTO: 26.5 PG (ref 24–34)
MCHC RBC AUTO-ENTMCNC: 33.8 G/DL (ref 31–37)
MCV RBC AUTO: 78.4 FL (ref 74–97)
PLATELET # BLD AUTO: 162 K/UL (ref 135–420)
PMV BLD AUTO: 9.9 FL (ref 9.2–11.8)
RBC # BLD AUTO: 3.85 M/UL (ref 4.2–5.3)
WBC # BLD AUTO: 11.2 K/UL (ref 4.6–13.2)

## 2019-07-24 PROCEDURE — 36415 COLL VENOUS BLD VENIPUNCTURE: CPT

## 2019-07-24 PROCEDURE — 97116 GAIT TRAINING THERAPY: CPT

## 2019-07-24 PROCEDURE — 97530 THERAPEUTIC ACTIVITIES: CPT

## 2019-07-24 PROCEDURE — 74011250636 HC RX REV CODE- 250/636: Performed by: PHYSICIAN ASSISTANT

## 2019-07-24 PROCEDURE — 74011250637 HC RX REV CODE- 250/637: Performed by: PHYSICIAN ASSISTANT

## 2019-07-24 PROCEDURE — 85027 COMPLETE CBC AUTOMATED: CPT

## 2019-07-24 RX ORDER — OXYCODONE HYDROCHLORIDE 5 MG/1
5-10 TABLET ORAL
Qty: 50 TAB | Refills: 0 | Status: SHIPPED | OUTPATIENT
Start: 2019-07-24 | End: 2019-08-07

## 2019-07-24 RX ORDER — ENOXAPARIN SODIUM 100 MG/ML
40 INJECTION SUBCUTANEOUS DAILY
Qty: 21 SYRINGE | Refills: 0 | Status: SHIPPED | OUTPATIENT
Start: 2019-07-24 | End: 2019-08-14

## 2019-07-24 RX ORDER — LANOLIN ALCOHOL/MO/W.PET/CERES
325 CREAM (GRAM) TOPICAL 2 TIMES DAILY WITH MEALS
Qty: 60 TAB | Refills: 0 | Status: SHIPPED | OUTPATIENT
Start: 2019-07-24

## 2019-07-24 RX ORDER — DOCUSATE SODIUM 100 MG/1
100 CAPSULE, LIQUID FILLED ORAL
Qty: 60 CAP | Refills: 0 | Status: SHIPPED | OUTPATIENT
Start: 2019-07-24

## 2019-07-24 RX ADMIN — OMEPRAZOLE 20 MG: 20 CAPSULE, DELAYED RELEASE ORAL at 09:44

## 2019-07-24 RX ADMIN — ENOXAPARIN SODIUM 40 MG: 40 INJECTION SUBCUTANEOUS at 09:45

## 2019-07-24 RX ADMIN — FERROUS SULFATE TAB 325 MG (65 MG ELEMENTAL FE) 325 MG: 325 (65 FE) TAB at 09:44

## 2019-07-24 RX ADMIN — OXYCODONE HYDROCHLORIDE 5 MG: 5 TABLET ORAL at 05:14

## 2019-07-24 RX ADMIN — Medication 2 G: at 01:25

## 2019-07-24 RX ADMIN — Medication 10 ML: at 05:18

## 2019-07-24 NOTE — ROUTINE PROCESS
Bedside and Verbal shift change report given to DARREL Woodson RN by Elly Rubin RN. Report included the following information SBAR, Kardex, OR Summary, Intake/Output and MAR.

## 2019-07-24 NOTE — PROGRESS NOTES
conducted a visit with Lupillo Winters, who is a [de-identified] y.o.,female. The  provided the following Interventions:  Initiated a relationship of care and support. Offered prayer and assurance of continued prayers on patient's behalf. Plan:  Chaplains will continue to follow and will provide pastoral care on an as needed/requested basis.  recommends bedside caregivers page  on duty if patient shows signs of acute spiritual or emotional distress. Rev. DARREL BarrDiv. Spiritual Care Dept.   [phone]

## 2019-07-24 NOTE — ROUTINE PROCESS
9618 - Bedside report received from [address] Patient in chair at this time. Pain 0/10. Plan of care for the day addressed with the patient.

## 2019-07-24 NOTE — PROGRESS NOTES
Problem: Mobility Impaired (Adult and Pediatric)  Goal: *Acute Goals and Plan of Care (Insert Text)  Description  In 1-7 days pt will be able to perform:  ST.  Bed mobility:  Rolling L to R to L modified independent for positioning. 2.  Supine to sit to supine S with HR for meals. 3.  Sit to stand to sit S with RW in prep for ambulation. LT.  Gait:  Ambulate >150ft S with RW, WBAT, for home/community mobility. 2.  Stair Negotiation:  Ascend/descend >3 steps CGA with HR for home entry. 3.  Activity tolerance: Tolerate up in chair 1-2 hours for ADLs. 4.  Patient/Family Education:  Patient/family to be independent with HEP for follow-up care and safe discharge. Outcome: Resolved/Met  Note:   PHYSICAL THERAPY TREATMENT/DISCHARGE    Patient: Sam Silverio (55 y.o. female)  Date: 2019  Diagnosis: OA (osteoarthritis) of hip [M16.9] Osteoarthritis of right hip  Procedure(s) (LRB):  RIGHT TOTAL HIP ARTHROPLASTY ANTERIOR APRPOACH WITH C-ARM, \"SPEC POP\" (Right) 1 Day Post-Op  Precautions: Fall, WBAT  Chart, physical therapy assessment, plan of care and goals were reviewed. ASSESSMENT:  Pt rating pain in R hip 0/10 and 5/10 w/ mobility. Pt able to participate in stair training, gt training, transfer training and HEP. Pt has met goals for this level of PT and is ready to transition to HHPT. Pt left supine in bed w/ all needs within reach. Nurse Jerzy Pill aware. Ice pack to R hip. Progression toward goals:  ?      Goals met  ? Improving appropriately and progressing toward goals  ? Improving slowly and progressing toward goals  ? Not making progress toward goals and plan of care will be adjusted     PLAN:  Patient will be discharged from physical therapy at this time. Rationale for discharge:  ? Goals Achieved  ? Plateau Reached  ? Patient not participating in therapy  ?  Other:  Discharge Recommendations:  Home Health  Further Equipment Recommendations for Discharge:  N/A SUBJECTIVE:   Patient stated I have no discomfort just laying here.     OBJECTIVE DATA SUMMARY:   Critical Behavior:  Neurologic State: Alert, Appropriate for age  Orientation Level: Oriented X4  Cognition: Appropriate decision making, Appropriate for age attention/concentration, Appropriate safety awareness, Follows commands  Safety/Judgement: Awareness of environment  Functional Mobility Training:  Bed Mobility:  Supine to Sit: Supervision  Sit to Supine: Supervision  Scooting: Supervision  Transfers:  Sit to Stand: Supervision  Stand to Sit: Supervision  Balance:  Sitting: Intact  Standing: Intact; With support  Ambulation/Gait Training:  Distance (ft): 165 Feet (ft)  Assistive Device: Walker, rolling;Gait belt  Ambulation - Level of Assistance: Supervision  Gait Abnormalities: Antalgic;Decreased step clearance  Right Side Weight Bearing: As tolerated  Base of Support: Shift to left  Stance: Right decreased  Speed/Kristie: Slow  Step Length: Left shortened;Right shortened  Swing Pattern: Left asymmetrical;Right asymmetrical  Interventions: Safety awareness training; Tactile cues; Verbal cues; Visual/Demos  Stairs:  Number of Stairs Trained: 5  Stairs - Level of Assistance: Stand-by assistance   Rail Use: Both  Neuro Re-Education:  Therapeutic Exercises:   Instructed and reviewed HEP for pt competency. Pt able to perform ankle pumps, ankle circles, quad sets, heel slides and LAQ x 5 reps R each. Pain:  Pain Scale 1: Numeric (0 - 10)  Pain Intensity 1: 0(5-6 w/ mobility)  Pain Location 1: Hip  Pain Orientation 1: Right  Pain Description 1: Aching  Pain Intervention(s) 1: Medication (see MAR); Ice;Rest  Activity Tolerance:   Fair   Please refer to the flowsheet for vital signs taken during this treatment. After treatment:   ? Patient left in no apparent distress sitting up in chair  ? Patient left in no apparent distress in bed  ? Call bell left within reach  ? Nursing notified  ? Caregiver present  ?  Bed alarm activated  Magdaleno Langston, PT   Time Calculation: 30 mins

## 2019-07-24 NOTE — PROGRESS NOTES
Transition of Care (DOROTHY) Plan:     Chart reviewed, met with pt in room. Pt lives alone, son will be staying with her once home. FOC offered, pt chose [facility] for follow up; referral placed with CMS. Pt has RW for home. DOROTHY Transportation:   How is patient being transported at discharge? Family/Friend      When? Once cleared by Therapy between 12-2pm     Is transport scheduled? N/A      Follow-up appointment and transportation:   PCP/Specialist?  See AVS for Appointment         Who is transporting to the follow-up appointment? Family/Friend      Is transport for follow up appointment scheduled? N/A    Communication plan (with patient/family): Who is being called? Patient or Next of Kin? Responsible party? Patient      What number(s) is to be used? See Facesheet      What service provider is calling for [place] services? When are they calling? 24-48 hours following discharge    Readmission Risk? (Green/Low; Yellow/Moderate; Red/High):  Green    Care Management Interventions  PCP Verified by CM:  Yes  Transition of Care Consult (CM Consult): [address]: No  Reason Outside [place]: Physician referred to specific agency  Discharge Durable Medical Equipment: No  Physical Therapy Consult: Yes  Occupational Therapy Consult: Yes  Current Support Network: Own Home  Confirm Follow Up Transport: Family  Plan discussed with Pt/Family/Caregiver: Yes  Freedom of Choice Offered: Yes  Discharge Location  Discharge Placement: Home with home health

## 2019-07-24 NOTE — PROGRESS NOTES
Progress Note      Patient: Kelsey Sage               Sex: female          DOA: 7/23/2019     YOB: 1940      Age:  [de-identified] y.o.        LOS:  LOS: 1 day     Status Post: Procedure(s):  RIGHT TOTAL HIP ARTHROPLASTY ANTERIOR APRPOACH WITH C-ARM, \"SPEC POP\"  Surgery Date: 7/23/2019            Subjective:     Kelsey Sage is a [de-identified] y.o. female who c/o right hip pain reasonably well controlled with prn pain meds. Patient denies any complaint of calf pain/ SOB or difficulty breathing. Objective:      Visit Vitals  /44 (BP 1 Location: Right arm, BP Patient Position: At rest)   Pulse 74   Temp 97.8 °F (36.6 °C)   Resp 16   Ht 5' 4\" (1.626 m)   Wt 87.6 kg (193 lb 2 oz)   SpO2 98%   BMI 33.15 kg/m²       Physical Exam:   Dressing:  clean, dry, intact  Wiggles Toes/Ankle  Foot sensation intact to light touch  No foot edema/ +1 Posterior Tibial Pulse  Leg Lengths appear equal    Xray - good    Intake and Output:  Current Shift:  No intake/output data recorded. Last three shifts:  07/22 1901 - 07/24 0700  In: 4033.2 [P.O.:490; I.V.:3543.2]  Out: 1650 [Urine:1500]  Voiding Status:  + void without need for Nieto catheter    Lab/Data Reviewed:  Recent Labs     07/24/19  0503   HGB 10.2*   HCT 30.2*         Medications Reviewed    Assessment/Plan     Principal Problem:    Osteoarthritis of right hip (7/17/2019)    Active Problems:    Hypertension (7/17/2019)      Hyperlipidemia (7/17/2019)      Obesity (7/17/2019)      OA (osteoarthritis) of hip (7/23/2019)        · Discontinue Oxygen. · Change IV to Saline Lock. · Discharge Planning for home. · Begin DVT Prophylaxis - Lovenox 40 mg SQ daily   · Discharge home today if cleared by physical therapy. The patient was seen and examined by Dr. Lissette Carrillo today as well and he is in agreement with above.

## 2019-07-24 NOTE — PROGRESS NOTES
Problem: Falls - Risk of  Goal: *Absence of Falls  Description  Document Pilar Spence Fall Risk and appropriate interventions in the flowsheet.   Outcome: Progressing Towards Goal  Note:   Fall Risk Interventions:  Mobility Interventions: Patient to call before getting OOB, Utilize walker, cane, or other assistive device    Mentation Interventions: Room close to nurse's station    Medication Interventions: Patient to call before getting OOB, Teach patient to arise slowly    Elimination Interventions: Call light in reach, Patient to call for help with toileting needs, Stay With Me (per policy)              Problem: Patient Education: Go to Patient Education Activity  Goal: Patient/Family Education  Outcome: Progressing Towards Goal     Problem: Hypertension  Goal: *Blood pressure within specified parameters  Outcome: Progressing Towards Goal  Goal: *Fluid volume balance  Outcome: Progressing Towards Goal  Goal: *Labs within defined limits  Outcome: Progressing Towards Goal     Problem: Hypertension  Goal: *Blood pressure within specified parameters  Outcome: Progressing Towards Goal  Goal: *Fluid volume balance  Outcome: Progressing Towards Goal  Goal: *Labs within defined limits  Outcome: Progressing Towards Goal     Problem: Pain  Goal: *Control of Pain  Outcome: Progressing Towards Goal     Problem: Patient Education: Go to Patient Education Activity  Goal: Patient/Family Education  Outcome: Progressing Towards Goal     Problem: Hip Replacement: Day of Surgery/Unit  Goal: Activity/Safety  Outcome: Progressing Towards Goal  Goal: Consults, if ordered  Outcome: Progressing Towards Goal  Goal: Diagnostic Test/Procedures  Outcome: Progressing Towards Goal  Goal: Nutrition/Diet  Outcome: Progressing Towards Goal  Goal: Medications  Outcome: Progressing Towards Goal  Goal: Respiratory  Outcome: Progressing Towards Goal  Goal: Treatments/Interventions/Procedures  Outcome: Progressing Towards Goal  Goal: Psychosocial  Outcome: Progressing Towards Goal  Goal: *Initiate mobility  Outcome: Progressing Towards Goal  Goal: *Optimal pain control at patient's stated goal  Outcome: Progressing Towards Goal  Goal: *Hemodynamically stable  Outcome: Progressing Towards Goal     Problem: Hip Replacement: Day of Surgery/Unit  Goal: Activity/Safety  Outcome: Progressing Towards Goal  Goal: Consults, if ordered  Outcome: Progressing Towards Goal  Goal: Diagnostic Test/Procedures  Outcome: Progressing Towards Goal  Goal: Nutrition/Diet  Outcome: Progressing Towards Goal  Goal: Medications  Outcome: Progressing Towards Goal  Goal: Respiratory  Outcome: Progressing Towards Goal  Goal: Treatments/Interventions/Procedures  Outcome: Progressing Towards Goal  Goal: Psychosocial  Outcome: Progressing Towards Goal  Goal: *Initiate mobility  Outcome: Progressing Towards Goal  Goal: *Optimal pain control at patient's stated goal  Outcome: Progressing Towards Goal  Goal: *Hemodynamically stable  Outcome: Progressing Towards Goal

## 2019-07-24 NOTE — PROGRESS NOTES
0745-Took vebal order with readback to hold BP meds atenelol and lisinopril this AM if systolic UE<464. If BP gets over 943 systolically, ok to administer at that time. 0823-Manual BP assessed, BP 98/50 in left arm, 98/48 right arm. Per verbal order from DEYSI Mayer, BP meds atenelol and lisinopril held at this time. 8529- Patient in bed at this time. A/O x 4. IV to right hand  intact and patent. TEDS and SCDS to BLE. Mepilex dressing to right hip CDI. Patient denies numbness/tingling. Pedal and radial pulses palpable. Lungs clear. Bowel sounds active to all quadrants. Patient able to get to 1750 on the incentive spirometer. Pain 0/10.     0950-Lovenox instruction and education given at this time. Patient verbalized understanding and demonstrated teachback. 1013-Called DEYSI Hernandez concerning patient's meds as patient will be taking lovenox and AVS shows patient still to take aspirin 81 mg as well. Per DEYSI Mayer, patient can take continue to take 81mg aspirin along with the lovenox. 1227-Discharge instructions reviewed with patient at this time. Opportunity for questions and clarification was provided. Patient has verbalized understanding. Patient was provided with care notes to include side effects of RX's. Arm bands removed and shredded. AVS reviewed with Lyndsey Hernandez RN. IV removed. Dressing CDI.

## 2019-09-02 ENCOUNTER — APPOINTMENT (OUTPATIENT)
Dept: VASCULAR SURGERY | Age: 79
End: 2019-09-02
Attending: PHYSICIAN ASSISTANT
Payer: MEDICARE

## 2019-09-02 ENCOUNTER — HOSPITAL ENCOUNTER (EMERGENCY)
Age: 79
Discharge: HOME | End: 2019-09-02
Attending: EMERGENCY MEDICINE
Payer: MEDICARE

## 2019-09-02 ENCOUNTER — APPOINTMENT (OUTPATIENT)
Dept: GENERAL RADIOLOGY | Age: 79
End: 2019-09-02
Attending: PHYSICIAN ASSISTANT
Payer: MEDICARE

## 2019-09-02 VITALS
RESPIRATION RATE: 20 BRPM | TEMPERATURE: 99.1 F | OXYGEN SATURATION: 98 % | HEIGHT: 64 IN | HEART RATE: 68 BPM | BODY MASS INDEX: 34.15 KG/M2 | DIASTOLIC BLOOD PRESSURE: 50 MMHG | WEIGHT: 200 LBS | SYSTOLIC BLOOD PRESSURE: 130 MMHG

## 2019-09-02 DIAGNOSIS — M17.12 OSTEOARTHRITIS OF LEFT KNEE, UNSPECIFIED OSTEOARTHRITIS TYPE: ICD-10-CM

## 2019-09-02 DIAGNOSIS — R60.9 PERIPHERAL EDEMA: Primary | ICD-10-CM

## 2019-09-02 DIAGNOSIS — E87.6 HYPOKALEMIA: ICD-10-CM

## 2019-09-02 LAB
ALBUMIN SERPL-MCNC: 2.8 G/DL (ref 3.4–5)
ALBUMIN/GLOB SERPL: 0.9 {RATIO} (ref 0.8–1.7)
ALP SERPL-CCNC: 101 U/L (ref 45–117)
ALT SERPL-CCNC: 11 U/L (ref 13–56)
ANION GAP SERPL CALC-SCNC: 5 MMOL/L (ref 3–18)
APPEARANCE UR: CLEAR
APTT PPP: 26.8 SEC (ref 23–36.4)
AST SERPL-CCNC: 6 U/L (ref 10–38)
BACTERIA URNS QL MICRO: ABNORMAL /HPF
BASOPHILS # BLD: 0.2 K/UL (ref 0–0.1)
BASOPHILS NFR BLD: 3 % (ref 0–2)
BILIRUB SERPL-MCNC: 0.6 MG/DL (ref 0.2–1)
BILIRUB UR QL: NEGATIVE
BNP SERPL-MCNC: 1339 PG/ML (ref 0–1800)
BUN SERPL-MCNC: 11 MG/DL (ref 7–18)
BUN/CREAT SERPL: 16 (ref 12–20)
CALCIUM SERPL-MCNC: 7.6 MG/DL (ref 8.5–10.1)
CHLORIDE SERPL-SCNC: 105 MMOL/L (ref 100–111)
CO2 SERPL-SCNC: 30 MMOL/L (ref 21–32)
COLOR UR: ABNORMAL
CREAT SERPL-MCNC: 0.68 MG/DL (ref 0.6–1.3)
DIFFERENTIAL METHOD BLD: ABNORMAL
EOSINOPHIL # BLD: 0 K/UL (ref 0–0.4)
EOSINOPHIL NFR BLD: 1 % (ref 0–5)
EPITH CASTS URNS QL MICRO: ABNORMAL /LPF (ref 0–5)
ERYTHROCYTE [DISTWIDTH] IN BLOOD BY AUTOMATED COUNT: 15.5 % (ref 11.6–14.5)
GLOBULIN SER CALC-MCNC: 3 G/DL (ref 2–4)
GLUCOSE SERPL-MCNC: 140 MG/DL (ref 74–99)
GLUCOSE UR STRIP.AUTO-MCNC: NEGATIVE MG/DL
HCT VFR BLD AUTO: 26.4 % (ref 35–45)
HGB BLD-MCNC: 8.1 G/DL (ref 12–16)
HGB UR QL STRIP: NEGATIVE
HYALINE CASTS URNS QL MICRO: ABNORMAL /LPF (ref 0–2)
INR PPP: 1.1 (ref 0.8–1.2)
KETONES UR QL STRIP.AUTO: NEGATIVE MG/DL
LEUKOCYTE ESTERASE UR QL STRIP.AUTO: NEGATIVE
LYMPHOCYTES # BLD: 0.7 K/UL (ref 0.9–3.6)
LYMPHOCYTES NFR BLD: 10 % (ref 21–52)
MCH RBC QN AUTO: 26.4 PG (ref 24–34)
MCHC RBC AUTO-ENTMCNC: 30.7 G/DL (ref 31–37)
MCV RBC AUTO: 86 FL (ref 74–97)
MONOCYTES # BLD: 0.8 K/UL (ref 0.05–1.2)
MONOCYTES NFR BLD: 12 % (ref 3–10)
MUCOUS THREADS URNS QL MICRO: ABNORMAL /LPF
NEUTS SEG # BLD: 4.8 K/UL (ref 1.8–8)
NEUTS SEG NFR BLD: 74 % (ref 40–73)
NITRITE UR QL STRIP.AUTO: NEGATIVE
PH UR STRIP: 5.5 [PH] (ref 5–8)
PLATELET # BLD AUTO: 175 K/UL (ref 135–420)
PMV BLD AUTO: 9.5 FL (ref 9.2–11.8)
POTASSIUM SERPL-SCNC: 3.4 MMOL/L (ref 3.5–5.5)
PROT SERPL-MCNC: 5.8 G/DL (ref 6.4–8.2)
PROT UR STRIP-MCNC: 100 MG/DL
PROTHROMBIN TIME: 14.3 SEC (ref 11.5–15.2)
RBC # BLD AUTO: 3.07 M/UL (ref 4.2–5.3)
RBC #/AREA URNS HPF: ABNORMAL /HPF (ref 0–5)
SODIUM SERPL-SCNC: 140 MMOL/L (ref 136–145)
SP GR UR REFRACTOMETRY: 1.03 (ref 1–1.03)
UROBILINOGEN UR QL STRIP.AUTO: 1 EU/DL (ref 0.2–1)
WBC # BLD AUTO: 6.5 K/UL (ref 4.6–13.2)
WBC URNS QL MICRO: ABNORMAL /HPF (ref 0–5)

## 2019-09-02 PROCEDURE — 80053 COMPREHEN METABOLIC PANEL: CPT

## 2019-09-02 PROCEDURE — 74011250636 HC RX REV CODE- 250/636: Performed by: PHYSICIAN ASSISTANT

## 2019-09-02 PROCEDURE — 73564 X-RAY EXAM KNEE 4 OR MORE: CPT

## 2019-09-02 PROCEDURE — 83880 ASSAY OF NATRIURETIC PEPTIDE: CPT

## 2019-09-02 PROCEDURE — 96366 THER/PROPH/DIAG IV INF ADDON: CPT

## 2019-09-02 PROCEDURE — 85610 PROTHROMBIN TIME: CPT

## 2019-09-02 PROCEDURE — 93970 EXTREMITY STUDY: CPT

## 2019-09-02 PROCEDURE — 96365 THER/PROPH/DIAG IV INF INIT: CPT

## 2019-09-02 PROCEDURE — 96375 TX/PRO/DX INJ NEW DRUG ADDON: CPT

## 2019-09-02 PROCEDURE — 85730 THROMBOPLASTIN TIME PARTIAL: CPT

## 2019-09-02 PROCEDURE — 85025 COMPLETE CBC W/AUTO DIFF WBC: CPT

## 2019-09-02 PROCEDURE — 81001 URINALYSIS AUTO W/SCOPE: CPT

## 2019-09-02 PROCEDURE — 99285 EMERGENCY DEPT VISIT HI MDM: CPT

## 2019-09-02 RX ORDER — FUROSEMIDE 40 MG/1
40 TABLET ORAL DAILY
Qty: 4 TAB | Refills: 0 | Status: SHIPPED | OUTPATIENT
Start: 2019-09-02 | End: 2019-09-06

## 2019-09-02 RX ORDER — POTASSIUM CHLORIDE 750 MG/1
20 TABLET, FILM COATED, EXTENDED RELEASE ORAL DAILY
Qty: 14 TAB | Refills: 0 | Status: SHIPPED | OUTPATIENT
Start: 2019-09-02 | End: 2019-09-09

## 2019-09-02 RX ORDER — ACETAMINOPHEN 10 MG/ML
1000 INJECTION, SOLUTION INTRAVENOUS ONCE
Status: COMPLETED | OUTPATIENT
Start: 2019-09-02 | End: 2019-09-02

## 2019-09-02 RX ORDER — MORPHINE SULFATE 4 MG/ML
4 INJECTION INTRAVENOUS
Status: COMPLETED | OUTPATIENT
Start: 2019-09-02 | End: 2019-09-02

## 2019-09-02 RX ORDER — ONDANSETRON 2 MG/ML
4 INJECTION INTRAMUSCULAR; INTRAVENOUS
Status: COMPLETED | OUTPATIENT
Start: 2019-09-02 | End: 2019-09-02

## 2019-09-02 RX ADMIN — MORPHINE SULFATE 4 MG: 4 INJECTION INTRAVENOUS at 09:47

## 2019-09-02 RX ADMIN — ACETAMINOPHEN 1000 MG: 10 INJECTION, SOLUTION INTRAVENOUS at 09:47

## 2019-09-02 RX ADMIN — ONDANSETRON 4 MG: 2 INJECTION INTRAMUSCULAR; INTRAVENOUS at 09:47

## 2019-09-02 NOTE — ED TRIAGE NOTES
Pt arrives via EMS for complaints of left knee pain and inability to ambulate. Pt states she had a right hip replacement 7/17 and has been progressing as expected. States that she has developed left knee pain and stiffness and states that she can not ambulate.

## 2019-09-02 NOTE — ED NOTES
Pt discharged, verbalized understanding of instructions. Ignacio montez provided and approved by nursing sup.

## 2019-09-02 NOTE — ED PROVIDER NOTES
EMERGENCY DEPARTMENT HISTORY AND PHYSICAL EXAM    Date: 9/2/2019  Patient Name: Symone Patiño    History of Presenting Illness     Chief Complaint   Patient presents with    Knee Pain         History Provided By: Patient    Chief Complaint: knee pain, leg swelling    HPI(Context):   8:53 AM  Symone Patiño is a 78 y.o. female with PMHX of OA, HTN, GERD, uterine cancer who presents to the emergency department C/O left knee pain. Associated sxs include left knee stiffness, BLE swelling. Pain worse with attempted ambulation. Sxs x 1 day. Pt reports R hip replacement 07/17/2019 by Imani Hercules DO (orthopedic). Pt was on lovenox injections but recently stopped as directed. Pt denies fever, chills, SOB, chest pain, hx of DVT/PE, trauma, and any other sxs or complaints. PCP: Shante Ann MD    Current Outpatient Medications   Medication Sig Dispense Refill    furosemide (LASIX) 40 mg tablet Take 1 Tab by mouth daily for 4 days. Indications: visible water retention 4 Tab 0    potassium chloride SR (KLOR-CON 10) 10 mEq tablet Take 2 Tabs by mouth daily for 7 days. Indications: low amount of potassium in the blood 14 Tab 0    docusate sodium (COLACE) 100 mg capsule Take 1 Cap by mouth two (2) times daily as needed for Constipation. 60 Cap 0    ferrous sulfate 325 mg (65 mg iron) tablet Take 1 Tab by mouth two (2) times daily (with meals). 60 Tab 0    simvastatin (ZOCOR) 10 mg tablet Take 10 mg by mouth nightly.  lidocaine (LIDODERM) 5 % 1 Patch by TransDERmal route as needed. Apply patch to the affected area for 12 hours a day and remove for 12 hours a day.  aspirin 81 mg chewable tablet Take 81 mg by mouth daily.  lisinopril (PRINIVIL, ZESTRIL) 20 mg tablet Take 40 mg by mouth daily.  calcium carbonate (CALTREX) 600 mg (1,500 mg) tablet Take 600 mg by mouth two (2) times a day.  atenolol (TENORMIN) 50 mg tablet Take 50 mg by mouth daily.       omeprazole (PRILOSEC) 20 mg capsule Take 20 mg by mouth daily. Past History     Past Medical History:  Past Medical History:   Diagnosis Date    Arthritis     hip, back    Cancer ([address] 1998; 2010    uterus and abdomen- chemo and radiatation    GERD (gastroesophageal reflux disease)     Heart murmur     Hypertension 2004    Ill-defined condition 2010    chemotherapy        Past Surgical History:  Past Surgical History:   Procedure Laterality Date    HX GYN      removal of vaginal cancer     HX HEENT      bilateral cateract removal    HX HYSTERECTOMY  1997    HX ORTHOPAEDIC      hammer toe right foot    HX ORTHOPAEDIC      left hip replacement        Family History:  History reviewed. No pertinent family history. Social History:  Social History     Tobacco Use    Smoking status: Never Smoker    Smokeless tobacco: Never Used   Substance Use Topics    Alcohol use: Yes     Comment: seldom special occasion    Drug use: No       Allergies: Allergies   Allergen Reactions    Sulfa (Sulfonamide Antibiotics) Swelling    Tape [Adhesive] Unknown (comments)     Silk tape causes blisters to skin  Patient reports she can tolerate paper tape         Review of Systems   Review of Systems   Constitutional: Negative for chills and fever. Respiratory: Negative for cough. Cardiovascular: Positive for leg swelling. Negative for chest pain. Musculoskeletal: Positive for arthralgias and joint swelling. Skin: Negative for color change. Neurological: Negative for dizziness, weakness, light-headedness and numbness. All other systems reviewed and are negative. Physical Exam     Vitals:    09/02/19 1000 09/02/19 1015 09/02/19 1030 09/02/19 1045   BP: 117/42 123/44 119/41 130/50   Pulse:       Resp:       Temp:       SpO2: 96% 96% 97% 98%   Weight:       Height:         Physical Exam   Constitutional: She is oriented to person, place, and time. She appears well-developed and well-nourished. No distress.  geriatric female in [facility]. Alert.    HENT:   Head: Normocephalic and atraumatic. Right Ear: External ear normal.   Left Ear: External ear normal.   Eyes: Conjunctivae are normal. No scleral icterus. Neck: Normal range of motion. Cardiovascular: Normal rate, regular rhythm, normal heart sounds and intact distal pulses. Exam reveals no gallop and no friction rub. No murmur heard. Pulses:       Dorsalis pedis pulses are 2+ on the right side, and 2+ on the left side. Posterior tibial pulses are 2+ on the right side, and 2+ on the left side. Pulmonary/Chest: Effort normal and breath sounds normal. No accessory muscle usage. No tachypnea. No respiratory distress. She has no decreased breath sounds. She has no wheezes. She has no rhonchi. She has no rales. Musculoskeletal:        Left knee: She exhibits decreased range of motion. She exhibits no ecchymosis and no erythema. Tenderness found. Right lower leg: She exhibits tenderness and edema. Left lower leg: She exhibits tenderness and edema. Legs:  Neurological: She is alert and oriented to person, place, and time. Skin: Skin is warm and dry. She is not diaphoretic. Psychiatric: She has a normal mood and affect. Judgment normal.   Nursing note and vitals reviewed. Diagnostic Study Results     Labs -   No results found for this or any previous visit (from the past 12 hour(s)). XR KNEE LT MIN 4 V   Final Result   IMPRESSION:         1. No evidence for fracture or dislocation. 2. Moderate tricompartment degenerative changes. 3. Edema. CT Results  (Last 48 hours)    None        CXR Results  (Last 48 hours)    None        DUPLEX BLE  · No evidence of deep vein thrombosis in the right lower extremity veins assessed. · No evidence of deep vein thrombosis in the left lower extremity veins assessed. Lower Extremity Venous Findings     Right Lower Venous     No evidence of deep vein thrombosis in the right lower extremity.      The common femoral, profunda femoral, femoral, popliteal, posterior tibial and saphenous femoral junction vein(s) were imaged in the transverse and longitudinal planes. The vessels showed normal color filling and compressibility. Doppler interrogation of the veins showed phasic and spontaneous flow. The peroneal vein(s) were not well visualized. There is a fluid filled area seen in the right popliteal fossa that could be indicative of a Baker's cyst. Clinical correlation is recommended. Baker's cyst noted behind left knee measuring 4.32 x 1.19 x 2.81 cm   Left Lower Venous     No evidence of deep vein thrombosis in the left lower extremity. The common femoral, saphenous femoral junction, profunda femoral, femoral and popliteal vein(s) were imaged in the transverse and longitudinal planes. The vessels showed normal color filling and compressibility. Doppler interrogation of the veins showed phasic and spontaneous flow. The posterior tibial and peroneal vein(s) were not well visualized. Patency of calf veins is documented by the color flow only. Medications given in the ED-  Medications   morphine injection 4 mg (4 mg IntraVENous Given 9/2/19 0947)   ondansetron (ZOFRAN) injection 4 mg (4 mg IntraVENous Given 9/2/19 0947)   acetaminophen (OFIRMEV) infusion 1,000 mg (0 mg IntraVENous IV Completed 9/2/19 1400)         Medical Decision Making   I am the first provider for this patient. I reviewed the vital signs, available nursing notes, past medical history, past surgical history, family history and social history. Vital Signs-Reviewed the patient's vital signs. Pulse Oximetry Analysis - 98% on RA     Records Reviewed: Nursing Notes    Provider Notes (Medical Decision Making): OA, gout, DVT, CHF, peripheral edema. No evidence of ischemic limb, compartment syndrome, septic joint, or cellulitis. Procedures:  Procedures    ED Course:   8:53 AM Initial assessment performed.  The patients presenting problems have been discussed, and they are in agreement with the care plan formulated and outlined with them. I have encouraged them to ask questions as they arise throughout their visit. Diagnosis and Disposition       Feels better with tx. Duplex negative for DVT. NVI. Will tx with lasix for peripheral edema as pt has taken intermittently in past for same. Supplement K+. No SOB or chest pain complaints. Lungs CTAB. Pt has pain meds at home. FU with Ortho. Reasons to RTED discussed with pt. All questions answered. Pt feels comfortable going home at this time. Pt expressed understanding and she agrees with plan. 1. Peripheral edema    2. Osteoarthritis of left knee, unspecified osteoarthritis type    3. Hypokalemia        PLAN:  1. D/C Home  2. Discharge Medication List as of 9/2/2019 12:43 PM      START taking these medications    Details   furosemide (LASIX) 40 mg tablet Take 1 Tab by mouth daily for 4 days. Indications: visible water retention, Print, Disp-4 Tab, R-0      potassium chloride SR (KLOR-CON 10) 10 mEq tablet Take 2 Tabs by mouth daily for 7 days. Indications: low amount of potassium in the blood, Print, Disp-14 Tab, R-0         CONTINUE these medications which have NOT CHANGED    Details   docusate sodium (COLACE) 100 mg capsule Take 1 Cap by mouth two (2) times daily as needed for Constipation. , Print, Disp-60 Cap, R-0      ferrous sulfate 325 mg (65 mg iron) tablet Take 1 Tab by mouth two (2) times daily (with meals). , Print, Disp-60 Tab, R-0      simvastatin (ZOCOR) 10 mg tablet Take 10 mg by mouth nightly., Historical Med      lidocaine (LIDODERM) 5 % 1 Patch by TransDERmal route as needed. Apply patch to the affected area for 12 hours a day and remove for 12 hours a day., Historical Med      aspirin 81 mg chewable tablet Take 81 mg by mouth daily. , Historical Med      lisinopril (PRINIVIL, ZESTRIL) 20 mg tablet Take 40 mg by mouth daily. , Historical Med      calcium carbonate (CALTREX) 600 mg (1,500 mg) tablet Take 600 mg by mouth two (2) times a day., Historical Med      atenolol (TENORMIN) 50 mg tablet Take 50 mg by mouth daily. , Historical Med      omeprazole (PRILOSEC) 20 mg capsule Take 20 mg by mouth daily. , Historical Med           3. Follow-up Information     Follow up With Specialties Details Why Contact Info    Venecia Bauer, DO Orthopedic Surgery   [address]      [facility] EMERGENCY DEPT Emergency Medicine  As needed, If symptoms worsen [address] [id number]  [phone]        _______________________________    Attestations: This note is prepared by Faisal Gonzales PA-C.  _______________________________          Please note that this dictation was completed with [employer], the computer voice recognition software. Quite often unanticipated grammatical, syntax, homophones, and other interpretive errors are inadvertently transcribed by the computer software. Please disregard these errors. Please excuse any errors that have escaped final proofreading.

## (undated) DEVICE — STERILE POLYISOPRENE POWDER-FREE SURGICAL GLOVES WITH EMOLLIENT COATING: Brand: PROTEXIS

## (undated) DEVICE — NEEDLE SPNL 20GA L3.5IN YEL HUB S STL REG WALL FIT STYL W/

## (undated) DEVICE — Z DISCONTINUED NO SUB IDED BLADE OPHTH DIA0.35MM GRDED FOR HNDL BEAV GRD

## (undated) DEVICE — STERILE POLYISOPRENE POWDER-FREE SURGICAL GLOVES: Brand: PROTEXIS

## (undated) DEVICE — THE CANADY HYBRID PLASMA SCALPEL IS AN ELECTROSURGICAL PLASMA SCALPEL THAT USES AN 85MM BENDABLE PADDLE BLADE TIP. THE ELECTROSURGICAL PLASMA SCALPEL IS USED TO SIMULTANEOUSLY CUT AND COAGULATE BIOLOGICAL TISSUE.: Brand: CANADY HYBRID PLASMA PADDLE BLADE

## (undated) DEVICE — CYTOTOME IRRIG 25GA L16MM ANT CAP BENT

## (undated) DEVICE — SUT VCRL + 1 36IN CT1 VIO --

## (undated) DEVICE — DEVON™ KNEE AND BODY STRAP 60" X 3" (1.5 M X 7.6 CM): Brand: DEVON

## (undated) DEVICE — ERASER HEMSTAT BPLR 45D 18G -- WET-FIELD

## (undated) DEVICE — CANN VISCOFLOW FRM 27G 22MM --

## (undated) DEVICE — SOL IRR STRL H2O 1500ML BTL --

## (undated) DEVICE — SYR 10ML LUER LOK 1/5ML GRAD --

## (undated) DEVICE — BONE WAX WHITE: Brand: BONE WAX WHITE

## (undated) DEVICE — REM POLYHESIVE ADULT PATIENT RETURN ELECTRODE: Brand: VALLEYLAB

## (undated) DEVICE — (D)PREP SKN CHLRAPRP APPL 26ML -- CONVERT TO ITEM 371833

## (undated) DEVICE — SOL IRRIGATION INJ NACL 0.9% 500ML BTL

## (undated) DEVICE — SOL INJ L R 1000ML BG --

## (undated) DEVICE — 1060 S-DRAPE SPCL INCISE 10/BX 4BX/CS: Brand: STERI-DRAPE™

## (undated) DEVICE — KENDALL SCD EXPRESS SLEEVES, KNEE LENGTH, MEDIUM: Brand: KENDALL SCD

## (undated) DEVICE — TOWEL SURG W17XL27IN STD BLU COT NONFENESTRATED PREWASHED

## (undated) DEVICE — GARMENT,MEDLINE,DVT,INT,CALF,MED, GEN2: Brand: MEDLINE

## (undated) DEVICE — SATINCRESCENT® KNIFE ANGLED BEVEL UP: Brand: SATINCRESCENT®

## (undated) DEVICE — ADHESIVE SKIN CLOSURE 4X22 CM PREMIERPRO EXOFINFUSION DISP

## (undated) DEVICE — Device

## (undated) DEVICE — GOWN,SIRUS,NONRNF,SETINSLV,XL,20/CS: Brand: MEDLINE

## (undated) DEVICE — PAD,EYE,1-5/8X2 5/8,STERILE,LF,1/PK: Brand: MEDLINE

## (undated) DEVICE — PACK PROCEDURE SURG ANTR HIP

## (undated) DEVICE — T5 HOOD WITH PEEL AWAY FACE SHIELD

## (undated) DEVICE — SATINSLIT® KNIFE 3.0MM ANGLED: Brand: SATINSLIT®

## (undated) DEVICE — SYR LR LCK 1ML GRAD NSAF 30ML --

## (undated) DEVICE — SUT VCRL + 2-0 27IN CT2 UD -- 36/BX

## (undated) DEVICE — HANDPIECE SET WITH FAN SPRAY TIP: Brand: INTERPULSE

## (undated) DEVICE — NEEDLE HYPO 25GA L1.5IN BLU POLYPR HUB S STL REG BVL STR

## (undated) DEVICE — DRESSING FOAM SELF ADH 20X10 CM ABSORBENT MEPILEX BORDER

## (undated) DEVICE — BLADE SAW 1.27X13X90 MM FOR LG BNE

## (undated) DEVICE — CORD BPLR L12FT DISP